# Patient Record
Sex: FEMALE | Race: WHITE | NOT HISPANIC OR LATINO | Employment: OTHER | URBAN - NONMETROPOLITAN AREA
[De-identification: names, ages, dates, MRNs, and addresses within clinical notes are randomized per-mention and may not be internally consistent; named-entity substitution may affect disease eponyms.]

---

## 2019-06-04 ENCOUNTER — HISTORICAL (OUTPATIENT)
Dept: ADMINISTRATIVE | Facility: HOSPITAL | Age: 64
End: 2019-06-04

## 2019-06-11 ENCOUNTER — HISTORICAL (OUTPATIENT)
Dept: ADMINISTRATIVE | Facility: HOSPITAL | Age: 64
End: 2019-06-11

## 2019-09-10 ENCOUNTER — HISTORICAL (OUTPATIENT)
Dept: ADMINISTRATIVE | Facility: HOSPITAL | Age: 64
End: 2019-09-10

## 2019-12-20 ENCOUNTER — HISTORICAL (OUTPATIENT)
Dept: ADMINISTRATIVE | Facility: HOSPITAL | Age: 64
End: 2019-12-20

## 2020-06-22 ENCOUNTER — HISTORICAL (OUTPATIENT)
Dept: ADMINISTRATIVE | Facility: HOSPITAL | Age: 65
End: 2020-06-22

## 2021-03-15 ENCOUNTER — HISTORICAL (OUTPATIENT)
Dept: ADMINISTRATIVE | Facility: HOSPITAL | Age: 66
End: 2021-03-15

## 2021-03-18 ENCOUNTER — HISTORICAL (OUTPATIENT)
Dept: ADMINISTRATIVE | Facility: HOSPITAL | Age: 66
End: 2021-03-18

## 2021-06-16 ENCOUNTER — HISTORICAL (OUTPATIENT)
Dept: ADMINISTRATIVE | Facility: HOSPITAL | Age: 66
End: 2021-06-16

## 2021-08-03 ENCOUNTER — HISTORICAL (OUTPATIENT)
Dept: ADMINISTRATIVE | Facility: HOSPITAL | Age: 66
End: 2021-08-03

## 2021-08-11 ENCOUNTER — HISTORICAL (OUTPATIENT)
Dept: ADMINISTRATIVE | Facility: HOSPITAL | Age: 66
End: 2021-08-11

## 2022-04-11 ENCOUNTER — HISTORICAL (OUTPATIENT)
Dept: ADMINISTRATIVE | Facility: HOSPITAL | Age: 67
End: 2022-04-11

## 2022-04-27 VITALS
OXYGEN SATURATION: 98 % | SYSTOLIC BLOOD PRESSURE: 142 MMHG | BODY MASS INDEX: 25.78 KG/M2 | DIASTOLIC BLOOD PRESSURE: 68 MMHG | WEIGHT: 164.25 LBS | HEIGHT: 67 IN

## 2023-01-30 ENCOUNTER — TELEPHONE (OUTPATIENT)
Dept: FAMILY MEDICINE | Facility: CLINIC | Age: 68
End: 2023-01-30
Payer: MEDICARE

## 2023-01-30 DIAGNOSIS — J30.2 SEASONAL ALLERGIES: Primary | ICD-10-CM

## 2023-01-30 RX ORDER — MONTELUKAST SODIUM 10 MG/1
10 TABLET ORAL DAILY
COMMUNITY
Start: 2022-11-23 | End: 2023-01-30 | Stop reason: SDUPTHER

## 2023-01-30 RX ORDER — MONTELUKAST SODIUM 10 MG/1
10 TABLET ORAL DAILY
Qty: 30 TABLET | Refills: 5 | Status: SHIPPED | OUTPATIENT
Start: 2023-01-30 | End: 2023-05-24 | Stop reason: ALTCHOICE

## 2023-01-30 NOTE — TELEPHONE ENCOUNTER
I called pt and she stated she was supposed to get refills on her montelukast last appt but it was never sent in. I refilled medication 90 day supply per pt request and I sent it to [facility] pharmacy. Pt is notified

## 2023-01-30 NOTE — TELEPHONE ENCOUNTER
----- Message from Amber Khan sent at 1/30/2023  8:24 AM CST -----  Regarding: return call    Pt requested to speak with the nurse    [phone]

## 2023-01-31 RX ORDER — LISINOPRIL 5 MG/1
5 TABLET ORAL DAILY
COMMUNITY
Start: 2022-04-25

## 2023-01-31 RX ORDER — ZOLPIDEM TARTRATE 10 MG/1
10 TABLET ORAL NIGHTLY
COMMUNITY
Start: 2022-08-17 | End: 2023-05-24 | Stop reason: SDUPTHER

## 2023-01-31 RX ORDER — ASPIRIN 325 MG
50000 TABLET, DELAYED RELEASE (ENTERIC COATED) ORAL WEEKLY
COMMUNITY
Start: 2022-08-17

## 2023-03-28 ENCOUNTER — OFFICE VISIT (OUTPATIENT)
Dept: FAMILY MEDICINE | Facility: CLINIC | Age: 68
End: 2023-03-28
Payer: MEDICARE

## 2023-03-28 VITALS
HEART RATE: 98 BPM | DIASTOLIC BLOOD PRESSURE: 74 MMHG | HEIGHT: 67 IN | WEIGHT: 160 LBS | TEMPERATURE: 98 F | BODY MASS INDEX: 25.11 KG/M2 | OXYGEN SATURATION: 96 % | SYSTOLIC BLOOD PRESSURE: 124 MMHG

## 2023-03-28 DIAGNOSIS — M25.521 RIGHT ELBOW PAIN: ICD-10-CM

## 2023-03-28 DIAGNOSIS — G89.29 CHRONIC PAIN OF BOTH KNEES: ICD-10-CM

## 2023-03-28 DIAGNOSIS — R05.3 CHRONIC COUGH: ICD-10-CM

## 2023-03-28 DIAGNOSIS — K21.9 GASTROESOPHAGEAL REFLUX DISEASE WITHOUT ESOPHAGITIS: ICD-10-CM

## 2023-03-28 DIAGNOSIS — I10 PRIMARY HYPERTENSION: ICD-10-CM

## 2023-03-28 DIAGNOSIS — M25.531 RIGHT WRIST PAIN: ICD-10-CM

## 2023-03-28 DIAGNOSIS — Z87.891 PERSONAL HISTORY OF NICOTINE DEPENDENCE: ICD-10-CM

## 2023-03-28 DIAGNOSIS — M54.42 ACUTE LEFT-SIDED LOW BACK PAIN WITH LEFT-SIDED SCIATICA: ICD-10-CM

## 2023-03-28 DIAGNOSIS — E78.00 HYPERCHOLESTEROLEMIA: ICD-10-CM

## 2023-03-28 DIAGNOSIS — M25.562 CHRONIC PAIN OF BOTH KNEES: ICD-10-CM

## 2023-03-28 DIAGNOSIS — Z11.59 ENCOUNTER FOR HEPATITIS C SCREENING TEST FOR LOW RISK PATIENT: ICD-10-CM

## 2023-03-28 DIAGNOSIS — Z12.2 ENCOUNTER FOR SCREENING FOR LUNG CANCER: Primary | ICD-10-CM

## 2023-03-28 DIAGNOSIS — M25.561 CHRONIC PAIN OF BOTH KNEES: ICD-10-CM

## 2023-03-28 DIAGNOSIS — M81.0 AGE-RELATED OSTEOPOROSIS WITHOUT CURRENT PATHOLOGICAL FRACTURE: ICD-10-CM

## 2023-03-28 PROBLEM — R01.1 HEART MURMUR: Status: ACTIVE | Noted: 2023-03-28

## 2023-03-28 PROBLEM — R73.01 IMPAIRED FASTING GLUCOSE: Status: ACTIVE | Noted: 2023-03-28

## 2023-03-28 PROBLEM — N60.19 FIBROCYSTIC BREAST CHANGES: Status: ACTIVE | Noted: 2023-03-28

## 2023-03-28 PROBLEM — G47.00 INSOMNIA: Status: ACTIVE | Noted: 2023-03-28

## 2023-03-28 PROBLEM — F17.210 CIGARETTE SMOKER: Status: ACTIVE | Noted: 2023-03-28

## 2023-03-28 PROBLEM — K13.21 LEUKOPLAKIA OF ORAL MUCOSA: Status: ACTIVE | Noted: 2023-03-28

## 2023-03-28 PROBLEM — Z96.22 PATENT TYMPANOSTOMY TUBE: Status: ACTIVE | Noted: 2023-03-28

## 2023-03-28 PROBLEM — I34.0 MITRAL VALVE INSUFFICIENCY: Status: ACTIVE | Noted: 2023-03-28

## 2023-03-28 PROBLEM — E55.9 VITAMIN D DEFICIENCY: Status: ACTIVE | Noted: 2023-03-28

## 2023-03-28 PROCEDURE — 99213 PR OFFICE/OUTPT VISIT, EST, LEVL III, 20-29 MIN: ICD-10-PCS | Mod: ,,, | Performed by: NURSE PRACTITIONER

## 2023-03-28 PROCEDURE — 99213 OFFICE O/P EST LOW 20 MIN: CPT | Mod: ,,, | Performed by: NURSE PRACTITIONER

## 2023-03-28 RX ORDER — FAMOTIDINE 20 MG/1
20 TABLET, FILM COATED ORAL NIGHTLY PRN
Qty: 60 TABLET | Refills: 11 | Status: SHIPPED | OUTPATIENT
Start: 2023-03-28 | End: 2024-03-27

## 2023-03-28 RX ORDER — METHYLPREDNISOLONE 4 MG/1
TABLET ORAL
Qty: 1 EACH | Refills: 0 | Status: SHIPPED | OUTPATIENT
Start: 2023-03-28 | End: 2023-05-24 | Stop reason: ALTCHOICE

## 2023-03-28 RX ORDER — MELOXICAM 7.5 MG/1
7.5 TABLET ORAL DAILY PRN
Qty: 30 TABLET | Refills: 3 | Status: SHIPPED | OUTPATIENT
Start: 2023-03-28 | End: 2023-05-24 | Stop reason: SDUPTHER

## 2023-03-28 NOTE — PROGRESS NOTES
Subjective:       Patient ID: Brandie Lobato is a 67 y.o. female.    Chief Complaint: Follow-up (Medication f/u)    HPI  67-year-old female presents to the clinic for follow-up. Patient c/o nontraumatic right elbow pain, low back back radiating to LLE, right wrist and bilateral knee pain for the past 3-4 months.   Colonoscopy in the past 10 year - last one WNL-patient refuses another colonoscopy.  Due for mammogram-patient refuses mammogram.  Tobacco: 1 pack/day- declines smoking cessation.   Drugs: Denies  EtOH: Denies    Labs from November 15, 2022  Glucose 99  BUN 15  Creatinine 0.6  GFR 94.8  Sodium 142  Potassium 4.0  ALT 15  AST 20  Alk phos 74  Cholesterol 189   HDL 39  .7  Triglycerides 126  TSH 1.70  Vitamin D 32.3  A1c 5.5  WBC 8.1  Hemoglobin 0.6  Hematocrit 44.1  Platelets 176    Labs from April 26, 2022  Glucose 94  BUN 14  Creatinine 0.60    Sodium 141  Potassium 4.4  ALT 14  AST 21  Alk phos 69  Cholesterol 195    HDL 38  Triglycerides 142  TSH 2.34  Vitamin D 23  WBC 8.1  Hemoglobin 14.5  Hematocrit 43.5  Platelets 185    Past Medical History:   Diagnosis Date    Fibrocystic breast     Heart murmur     Hypercholesteremia     Hypertension     Impaired fasting blood sugar     Insomnia     Mitral regurgitation     Vitamin D deficiency      Social History     Tobacco Use    Smoking status: Every Day     Packs/day: 1.00     Types: Cigarettes    Smokeless tobacco: Never   Substance Use Topics    Alcohol use: Not Currently    Drug use: Never     Past Surgical History:   Procedure Laterality Date    ADENOIDECTOMY      CHOLECYSTECTOMY      HYSTERECTOMY       History reviewed. No pertinent family history.    Review of Systems     See HPI  Objective:      Physical Exam   Constitutional: She is oriented to person, place, and time.   HENT:   Head: Normocephalic and atraumatic.   Nose: Nose normal.   Mouth/Throat: Mucous membranes are moist. Oropharynx is clear.   Eyes: Pupils are equal,  round, and reactive to light. Conjunctivae are normal.   Cardiovascular: Normal rate, regular rhythm and normal heart sounds. Exam reveals no gallop and no friction rub.   No murmur heard.  Pulmonary/Chest: Effort normal and breath sounds normal.   Abdominal: Normal appearance.   Musculoskeletal:      Left elbow: Normal.      Right wrist: No swelling, deformity, tenderness, bony tenderness or snuff box tenderness.      Left wrist: No swelling, deformity, tenderness, bony tenderness or snuff box tenderness.      Cervical back: Normal and normal range of motion.      Thoracic back: Normal.      Lumbar back: Tenderness (left) present. No deformity or bony tenderness. Positive left straight leg raise test. Negative right straight leg raise test.      Right lower leg: No edema.      Left lower leg: No edema.      Comments: Right elbow with mild tenderness w/o obvious deformities, erythema, ecchymosis, masses, swelling, abrasions, lacerations--NVI distally.    Neurological: She is alert and oriented to person, place, and time.   Skin: Skin is warm and dry. No rash noted. No jaundice or pallor.   Psychiatric: Her behavior is normal. Mood, judgment and thought content normal.   Nursing note and vitals reviewed.    Vitals:    03/28/23 1505   BP: 124/74   Pulse: 98   Temp: 97.5 °F (36.4 °C)     Assessment/Plan:       1. Encounter for screening for lung cancer    2. Gastroesophageal reflux disease without esophagitis    3. Acute left-sided low back pain with left-sided sciatica    4. Age-related osteoporosis without current pathological fracture    5. Right elbow pain    6. Right wrist pain    7. Chronic pain of both knees    8. Chronic cough    9. Personal history of nicotine dependence    10. Primary hypertension    11. Hypercholesterolemia    12. Encounter for hepatitis C screening test for low risk patient        1. Encounter for screening for lung cancer  - CT Chest Lung Screening Low Dose; Future    2. Gastroesophageal  reflux disease without esophagitis  - famotidine (PEPCID) 20 MG tablet; Take 1 tablet (20 mg total) by mouth nightly as needed for Heartburn.  Dispense: 60 tablet; Refill: 11    3. Acute left-sided low back pain with left-sided sciatica  - meloxicam (MOBIC) 7.5 MG tablet; Take 1 tablet (7.5 mg total) by mouth daily as needed for Pain (pain).  Dispense: 30 tablet; Refill: 3  - methylPREDNISolone (MEDROL DOSEPACK) 4 mg tablet; use as directed  Dispense: 1 each; Refill: 0    4. Age-related osteoporosis without current pathological fracture  - DXA Bone Density Axial Skeleton 1 or more sites; Future    5. Right elbow pain  - C4 Complement; Future  - Uric Acid; Future  - CYCLIC CITRULLINATED PEPTIDE (CCP) ANTIBODY; Future  - C-Reactive Protein; Future  - Sedimentation rate; Future  - GRZEGORZ IgG by IFA; Future    6. Right wrist pain  - C4 Complement; Future  - Uric Acid; Future  - CYCLIC CITRULLINATED PEPTIDE (CCP) ANTIBODY; Future  - C-Reactive Protein; Future  - Sedimentation rate; Future  - GRZEGORZ IgG by IFA; Future    7. Chronic pain of both knees  - C4 Complement; Future  - Uric Acid; Future  - CYCLIC CITRULLINATED PEPTIDE (CCP) ANTIBODY; Future  - C-Reactive Protein; Future  - Sedimentation rate; Future  - GRZEGORZ IgG by IFA; Future    8. Chronic cough  - CYCLIC CITRULLINATED PEPTIDE (CCP) ANTIBODY; Future    9. Personal history of nicotine dependence  - CT Chest Lung Screening Low Dose; Future    10. Primary hypertension    11. Hypercholesterolemia    12. Encounter for hepatitis C screening test for low risk patient    Follow up for appt pending in May 2023.Call sooner if needed.

## 2023-03-30 ENCOUNTER — TELEPHONE (OUTPATIENT)
Dept: FAMILY MEDICINE | Facility: CLINIC | Age: 68
End: 2023-03-30
Payer: MEDICARE

## 2023-03-30 RX ORDER — AMOXICILLIN 500 MG/1
500 TABLET, FILM COATED ORAL 3 TIMES DAILY
Qty: 30 TABLET | Refills: 0 | Status: SHIPPED | OUTPATIENT
Start: 2023-03-30 | End: 2023-04-09

## 2023-03-30 NOTE — TELEPHONE ENCOUNTER
Pt stated she would like to get a round of abx called out for her sore throat. She stated she did get the dose pack but she wants some abx as well.

## 2023-04-03 ENCOUNTER — HOSPITAL ENCOUNTER (OUTPATIENT)
Dept: RADIOLOGY | Facility: HOSPITAL | Age: 68
Discharge: HOME | End: 2023-04-03
Attending: NURSE PRACTITIONER
Payer: MEDICARE

## 2023-04-03 DIAGNOSIS — Z87.891 PERSONAL HISTORY OF NICOTINE DEPENDENCE: ICD-10-CM

## 2023-04-03 DIAGNOSIS — Z12.2 ENCOUNTER FOR SCREENING FOR LUNG CANCER: ICD-10-CM

## 2023-04-03 PROCEDURE — 71271 CT THORAX LUNG CANCER SCR C-: CPT | Mod: TC

## 2023-04-03 NOTE — PROGRESS NOTES
Please let patient know her CT of lungs showed COPD, degenerative changes, atherosclerotic plaquing in her arteries and incidental finding of a 6-7mm, nonobstructive stone in the right kidney. Would patient be interested in referral to urology?

## 2023-04-13 ENCOUNTER — HOSPITAL ENCOUNTER (EMERGENCY)
Facility: HOSPITAL | Age: 68
Discharge: HOME | End: 2023-04-13
Attending: FAMILY MEDICINE
Payer: MEDICARE

## 2023-04-13 ENCOUNTER — HOSPITAL ENCOUNTER (OUTPATIENT)
Dept: RADIOLOGY | Facility: HOSPITAL | Age: 68
Discharge: HOME | End: 2023-04-13
Attending: NURSE PRACTITIONER
Payer: MEDICARE

## 2023-04-13 VITALS
HEART RATE: 88 BPM | WEIGHT: 164 LBS | DIASTOLIC BLOOD PRESSURE: 78 MMHG | SYSTOLIC BLOOD PRESSURE: 119 MMHG | BODY MASS INDEX: 25.74 KG/M2 | OXYGEN SATURATION: 98 % | TEMPERATURE: 98 F | HEIGHT: 67 IN | RESPIRATION RATE: 18 BRPM

## 2023-04-13 DIAGNOSIS — K11.21 PAROTITIS, ACUTE: Primary | ICD-10-CM

## 2023-04-13 DIAGNOSIS — M81.0 AGE-RELATED OSTEOPOROSIS WITHOUT CURRENT PATHOLOGICAL FRACTURE: ICD-10-CM

## 2023-04-13 LAB
AMYLASE SERPL-CCNC: 468 UNIT/L (ref 30–100)
ANION GAP SERPL CALC-SCNC: 4 MEQ/L (ref 2–13)
BASOPHILS # BLD AUTO: 0.05 X10(3)/MCL (ref 0.01–0.08)
BASOPHILS NFR BLD AUTO: 0.5 % (ref 0.1–1.2)
BUN SERPL-MCNC: 14 MG/DL (ref 7–20)
CALCIUM SERPL-MCNC: 9.7 MG/DL (ref 8.4–10.2)
CHLORIDE SERPL-SCNC: 107 MMOL/L (ref 98–110)
CO2 SERPL-SCNC: 28 MMOL/L (ref 21–32)
CREAT SERPL-MCNC: 0.73 MG/DL (ref 0.66–1.25)
CREAT/UREA NIT SERPL: 19 (ref 12–20)
EOSINOPHIL # BLD AUTO: 0.32 X10(3)/MCL (ref 0.04–0.36)
EOSINOPHIL NFR BLD AUTO: 3.2 % (ref 0.7–7)
ERYTHROCYTE [DISTWIDTH] IN BLOOD BY AUTOMATED COUNT: 13.2 % (ref 11–14.5)
GFR SERPLBLD CREATININE-BSD FMLA CKD-EPI: 89 MLS/MIN/1.73/M2
GLUCOSE SERPL-MCNC: 138 MG/DL (ref 70–115)
HCT VFR BLD AUTO: 41.7 % (ref 36–48)
HGB BLD-MCNC: 14.3 G/DL (ref 11.8–16)
IMM GRANULOCYTES # BLD AUTO: 0.03 X10(3)/MCL (ref 0–0.03)
IMM GRANULOCYTES NFR BLD AUTO: 0.3 % (ref 0–0.5)
LIPASE SERPL-CCNC: 49 U/L (ref 23–300)
LYMPHOCYTES # BLD AUTO: 3.89 X10(3)/MCL (ref 1.16–3.74)
LYMPHOCYTES NFR BLD AUTO: 38.7 % (ref 20–55)
MCH RBC QN AUTO: 31.2 PG (ref 27–34)
MCV RBC AUTO: 90.8 FL (ref 79–99)
MEAN CELL HEMOGLOBIN CONCENTRATION (OHS) G/DL: 34.3 G/DL (ref 31–37)
MONOCYTES # BLD AUTO: 0.56 X10(3)/MCL (ref 0.24–0.36)
MONOCYTES NFR BLD AUTO: 5.6 % (ref 4.7–12.5)
NEUTROPHILS # BLD AUTO: 5.2 X10(3)/MCL (ref 1.56–6.13)
NEUTROPHILS NFR BLD AUTO: 51.7 % (ref 37–73)
NRBC BLD AUTO-RTO: 0 % (ref 0–1)
PLATELET # BLD AUTO: 168 X10(3)/MCL (ref 140–371)
PMV BLD AUTO: 11.6 FL (ref 9.4–12.4)
POTASSIUM SERPL-SCNC: 3.5 MMOL/L (ref 3.5–5.1)
RBC # BLD AUTO: 4.59 X10(6)/MCL (ref 4–5.1)
SODIUM SERPL-SCNC: 139 MMOL/L (ref 135–145)
WBC # SPEC AUTO: 10.1 X10(3)/MCL (ref 4–11.5)

## 2023-04-13 PROCEDURE — 25000003 PHARM REV CODE 250: Performed by: FAMILY MEDICINE

## 2023-04-13 PROCEDURE — 77080 DXA BONE DENSITY AXIAL: CPT | Mod: TC

## 2023-04-13 PROCEDURE — 80048 BASIC METABOLIC PNL TOTAL CA: CPT | Performed by: FAMILY MEDICINE

## 2023-04-13 PROCEDURE — 85025 COMPLETE CBC W/AUTO DIFF WBC: CPT | Performed by: FAMILY MEDICINE

## 2023-04-13 PROCEDURE — 99283 EMERGENCY DEPT VISIT LOW MDM: CPT | Mod: 25

## 2023-04-13 PROCEDURE — 82150 ASSAY OF AMYLASE: CPT | Performed by: FAMILY MEDICINE

## 2023-04-13 PROCEDURE — 83690 ASSAY OF LIPASE: CPT | Performed by: FAMILY MEDICINE

## 2023-04-13 PROCEDURE — 36415 COLL VENOUS BLD VENIPUNCTURE: CPT | Performed by: FAMILY MEDICINE

## 2023-04-13 RX ORDER — CEPHALEXIN 500 MG/1
500 CAPSULE ORAL
Status: COMPLETED | OUTPATIENT
Start: 2023-04-13 | End: 2023-04-13

## 2023-04-13 RX ORDER — CEPHALEXIN 500 MG/1
500 CAPSULE ORAL EVERY 6 HOURS
Qty: 28 CAPSULE | Refills: 0 | Status: SHIPPED | OUTPATIENT
Start: 2023-04-13 | End: 2023-04-20

## 2023-04-13 RX ADMIN — CEPHALEXIN 500 MG: 500 CAPSULE ORAL at 11:04

## 2023-04-14 NOTE — ED PROVIDER NOTES
Encounter Date: 4/13/2023       History     Chief Complaint   Patient presents with    Facial Swelling     SWELLING TO RIGHT SIDE OF FACE AND EAR, STARTED THIS EVENING     67-year-old white female presents to the emergency room complaining of right facial swelling starting tonight.  Patient states some mild pain.  Patient denies any toothache or earache.  Patient states he had a similar problem in the past but does not remember what the cause was.  Patient denies any fever sore throat.  Patient denies any other complaints.    The history is provided by the patient.   Review of patient's allergies indicates:  No Known Allergies  Past Medical History:   Diagnosis Date    Fibrocystic breast     Heart murmur     Hypercholesteremia     Hypertension     Impaired fasting blood sugar     Insomnia     Mitral regurgitation     Vitamin D deficiency      Past Surgical History:   Procedure Laterality Date    ADENOIDECTOMY      CHOLECYSTECTOMY      HYSTERECTOMY       History reviewed. No pertinent family history.  Social History     Tobacco Use    Smoking status: Every Day     Packs/day: 1.00     Types: Cigarettes    Smokeless tobacco: Never   Substance Use Topics    Alcohol use: Not Currently    Drug use: Never     Review of Systems   HENT:  Positive for facial swelling. Negative for ear pain and sore throat.    All other systems reviewed and are negative.    Physical Exam     Initial Vitals [04/13/23 2156]   BP Pulse Resp Temp SpO2   (!) 178/84 76 20 (!) 95.9 °F (35.5 °C) 95 %      MAP       --         Physical Exam    Nursing note and vitals reviewed.  Constitutional:   Well-developed well-nourished white female alert in no acute distress.   HENT:   Head is atraumatic normocephalic.  Left TM with tube in place.  Right external auditory canal with ceruminosis and unable to see TM.  Oropharynx is clear with dry mucous membranes.  No obvious dental abscess noted and no tooth tenderness palpation.  Right parotid area swelling with  minimal tenderness.  Nose is clear with no discharge.  No facial erythema or increased warmth.   Neck:   Neck is supple with anterior cervical lymphadenopathy.   Cardiovascular:            Heart is regular rate and rhythm without murmur.   Pulmonary/Chest:   Lungs are clear to auscultation bilaterally.   Abdominal:   Abdomen with positive bowel sounds.  Abdomen soft and nontender with no guarding or rebound.   Musculoskeletal:      Comments: Extremities with full range of motion throughout.     Neurological:   Patient is alert and oriented x3.  Upper and lower extremities with normal sensation and strength bilaterally.   Skin:   Skin is warm and dry.   Psychiatric: She has a normal mood and affect. Her behavior is normal. Thought content normal.       ED Course   Procedures  Labs Reviewed   BASIC METABOLIC PANEL - Abnormal; Notable for the following components:       Result Value    Glucose Level 138 (*)     All other components within normal limits   AMYLASE - Abnormal; Notable for the following components:    Amylase Level 468 (*)     All other components within normal limits   CBC WITH DIFFERENTIAL - Abnormal; Notable for the following components:    Lymph # 3.89 (*)     Mono # 0.56 (*)     All other components within normal limits   LIPASE - Normal   CBC W/ AUTO DIFFERENTIAL    Narrative:     The following orders were created for panel order CBC auto differential.  Procedure                               Abnormality         Status                     ---------                               -----------         ------                     CBC with Differential[[id number]]        Abnormal            Final result                 Please view results for these tests on the individual orders.          Imaging Results    None          Medications   cephALEXin capsule 500 mg (has no administration in time range)     Medical Decision Making:   Initial Assessment:   Parotitis  Clinical Tests:   Lab Tests: Ordered and  Reviewed  The following lab test(s) were unremarkable: Lipase, CBC and BMP  ED Management:  Discussed lab results with patient.  elevated amylase is consistent with parotitis.  We will treat patient with Keflex told her to increase her fluids use lemon drops to stimulate the parotid gland.  Instructed patient to follow up with her PCP if not improved.                        Clinical Impression:   Final diagnoses:  [K11.21] Parotitis, acute (Primary)        ED Disposition Condition    Discharge Stable          ED Prescriptions       Medication Sig Dispense Start Date End Date Auth. Provider    cephALEXin (KEFLEX) 500 MG capsule Take 1 capsule (500 mg total) by mouth every 6 (six) hours. for 7 days 28 capsule 4/13/2023 4/20/2023 Rubin Lemus MD          Follow-up Information       Follow up With Specialties Details Why Contact Info    Kalie Little NP Family Medicine In 1 week  [address] [facility] [address]  [phone]               Rubin Lemus MD  04/13/23 2211

## 2023-04-24 ENCOUNTER — OFFICE VISIT (OUTPATIENT)
Dept: FAMILY MEDICINE | Facility: CLINIC | Age: 68
End: 2023-04-24
Payer: MEDICARE

## 2023-04-24 VITALS
TEMPERATURE: 99 F | OXYGEN SATURATION: 95 % | SYSTOLIC BLOOD PRESSURE: 132 MMHG | HEIGHT: 67 IN | BODY MASS INDEX: 25.74 KG/M2 | WEIGHT: 164 LBS | DIASTOLIC BLOOD PRESSURE: 84 MMHG | HEART RATE: 85 BPM

## 2023-04-24 DIAGNOSIS — H66.005 RECURRENT ACUTE SUPPURATIVE OTITIS MEDIA WITHOUT SPONTANEOUS RUPTURE OF LEFT TYMPANIC MEMBRANE: Primary | ICD-10-CM

## 2023-04-24 PROCEDURE — 99212 OFFICE O/P EST SF 10 MIN: CPT | Mod: ,,, | Performed by: NURSE PRACTITIONER

## 2023-04-24 PROCEDURE — 99212 PR OFFICE/OUTPT VISIT, EST, LEVL II, 10-19 MIN: ICD-10-PCS | Mod: ,,, | Performed by: NURSE PRACTITIONER

## 2023-04-24 RX ORDER — AMOXICILLIN AND CLAVULANATE POTASSIUM 875; 125 MG/1; MG/1
1 TABLET, FILM COATED ORAL 2 TIMES DAILY
Qty: 14 TABLET | Refills: 0 | Status: SHIPPED | OUTPATIENT
Start: 2023-04-24 | End: 2023-05-24 | Stop reason: ALTCHOICE

## 2023-04-24 RX ORDER — OFLOXACIN 3 MG/ML
5 SOLUTION AURICULAR (OTIC) DAILY
Qty: 5 ML | Refills: 1 | Status: SHIPPED | OUTPATIENT
Start: 2023-04-24

## 2023-04-24 NOTE — PROGRESS NOTES
Subjective:       Patient ID: Brandie Lobato is a 67 y.o. female.    Chief Complaint: Ear Drainage    HPI  Patient presents to clinic with c/o left ear drainage and pain since last night. Reports PE tubes 1 year ago. Denies f/c. Seen in ER 4/13/2023 with right side facial swelling and pain, dx parotitis, rx cephalexin.   Past Medical History:   Diagnosis Date    Fibrocystic breast     Heart murmur     Hypercholesteremia     Hypertension     Impaired fasting blood sugar     Insomnia     Mitral regurgitation     Vitamin D deficiency      Social History     Tobacco Use    Smoking status: Every Day     Packs/day: 1.00     Types: Cigarettes    Smokeless tobacco: Never   Substance Use Topics    Alcohol use: Not Currently    Drug use: Never     Past Surgical History:   Procedure Laterality Date    ADENOIDECTOMY      CHOLECYSTECTOMY      HYSTERECTOMY       History reviewed. No pertinent family history.    Review of Systems   Constitutional:  Negative for chills and fever.   HENT:  Positive for ear discharge and ear pain. Negative for nasal congestion, rhinorrhea and sore throat.    Respiratory:  Positive for cough (chronic).    Cardiovascular:  Negative for chest pain.   Integumentary:  Negative for rash.   All other systems reviewed and are negative.     Objective:      Physical Exam   Constitutional: She is oriented to person, place, and time.   HENT:   Head: Normocephalic and atraumatic.   Right Ear: External ear and ear canal normal. No drainage. No mastoid tenderness. Tympanic membrane is not perforated and not erythematous.   Left Ear: External ear and ear canal normal. There is drainage. No mastoid tenderness. Tympanic membrane is erythematous. Tympanic membrane is not perforated.   Nose: Nose normal.   Mouth/Throat: Mucous membranes are moist. Oropharynx is clear.   Eyes: Pupils are equal, round, and reactive to light. Conjunctivae are normal.   Cardiovascular: Normal rate, regular rhythm and normal heart sounds.  Exam reveals no gallop and no friction rub.   No murmur heard.  Pulmonary/Chest: Effort normal and breath sounds normal.   Abdominal: Normal appearance.   Musculoskeletal:         General: Normal range of motion.      Cervical back: Normal range of motion and neck supple.   Neurological: She is alert and oriented to person, place, and time.   Skin: Skin is warm and dry. No rash noted. No jaundice or pallor.   Psychiatric: Her behavior is normal. Mood, judgment and thought content normal.   Nursing note and vitals reviewed.    Vitals:    04/24/23 1557   BP: 132/84   Pulse: 85   Temp: 99.2 °F (37.3 °C)     Admission on 04/13/2023, Discharged on 04/13/2023   Component Date Value Ref Range Status    Sodium Level 04/13/2023 139  135 - 145 mmol/L Final    Potassium Level 04/13/2023 3.5  3.5 - 5.1 mmol/L Final    Chloride 04/13/2023 107  98 - 110 mmol/L Final    Carbon Dioxide 04/13/2023 28  21 - 32 mmol/L Final    Glucose Level 04/13/2023 138 (H)  70 - 115 mg/dL Final    Blood Urea Nitrogen 04/13/2023 14.0  7.0 - 20.0 mg/dL Final    Creatinine 04/13/2023 0.73  0.66 - 1.25 mg/dL Final    BUN/Creatinine Ratio 04/13/2023 19  12 - 20 Final    Calcium Level Total 04/13/2023 9.7  8.4 - 10.2 mg/dL Final    Anion Gap 04/13/2023 4.0  2.0 - 13.0 mEq/L Final    eGFR 04/13/2023 89  mls/min/1.73/m2 Final                         EGFR INTERPRETATION    Beginning 8/15/22 we are reporting the eGFRcr calculation as recommended by the [employer]. The eGFRcr equation has similar overall performance characteristics to the older equation, but the values may differ by more than 10% particularly at higher values of eGFRcr and younger adult ages.    NKF stages of chronic kidney disease (CKD)  Stage 1: Kidney damage with normal or increased eGFR (>90 mL/min/1.73 m^2)  Stage 2: Mild reduction in GFR (60-89 mL/min/1.73 m^2)  Stage 3a: Moderate reduction in GFR (45-59 mL/min/1.73 m^2)  Stage 3b: Moderate reduction in GFR (30-44  mL/min/1.73 m^2)  Stage 4: Severe reduction in GFR (15-29 mL/min/1.73 m^2)  Stage 5: Kidney failure (GFR <15 mL/min/1.73 m^2)        Lipase Level 04/13/2023 49  23 - 300 U/L Final    Amylase Level 04/13/2023 468 (H)  30 - 100 unit/L Final    WBC 04/13/2023 10.1  4.0 - 11.5 x10(3)/mcL Final    RBC 04/13/2023 4.59  4.00 - 5.10 x10(6)/mcL Final    Hgb 04/13/2023 14.3  11.8 - 16.0 g/dL Final    Hct 04/13/2023 41.7  36.0 - 48.0 % Final    MCV 04/13/2023 90.8  79.0 - 99.0 fL Final    MCH 04/13/2023 31.2  27.0 - 34.0 pg Final    MCHC 04/13/2023 34.3  31.0 - 37.0 g/dL Final    RDW 04/13/2023 13.2  11.0 - 14.5 % Final    Platelet 04/13/2023 168  140 - 371 x10(3)/mcL Final    MPV 04/13/2023 11.6  9.4 - 12.4 fL Final    Neut % 04/13/2023 51.7  37 - 73 % Final    Lymph % 04/13/2023 38.7  20 - 55 % Final    Mono % 04/13/2023 5.6  4.7 - 12.5 % Final    Eos % 04/13/2023 3.2  0.7 - 7 % Final    Basophil % 04/13/2023 0.5  0.1 - 1.2 % Final    Lymph # 04/13/2023 3.89 (H)  1.16 - 3.74 x10(3)/mcL Final    Neut # 04/13/2023 5.20  1.56 - 6.13 x10(3)/mcL Final    Mono # 04/13/2023 0.56 (H)  0.24 - 0.36 x10(3)/mcL Final    Eos # 04/13/2023 0.32  0.04 - 0.36 x10(3)/mcL Final    Baso # 04/13/2023 0.05  0.01 - 0.08 x10(3)/mcL Final    IG# 04/13/2023 0.03  0.0001 - 0.031 x10(3)/mcL Final    IG% 04/13/2023 0.3  0 - 0.5 % Final    NRBC% 04/13/2023 0.0  0 - 1 % Final   Last labs from ER visit.  Assessment/Plan:       1. Recurrent acute suppurative otitis media without spontaneous rupture of left tympanic membrane          1. Recurrent acute suppurative otitis media without spontaneous rupture of left tympanic membrane  - amoxicillin-clavulanate 875-125mg (AUGMENTIN) 875-125 mg per tablet; Take 1 tablet by mouth 2 (two) times daily.  Dispense: 14 tablet; Refill: 0  - ofloxacin (FLOXIN) 0.3 % otic solution; Place 5 drops into the left ear once daily.  Dispense: 5 mL; Refill: 1      Follow up if symptoms worsen or fail to improve.Call sooner if  needed.

## 2023-04-29 PROBLEM — J44.1 CHRONIC OBSTRUCTIVE PULMONARY DISEASE WITH (ACUTE) EXACERBATION: Status: ACTIVE | Noted: 2023-04-29

## 2023-05-16 ENCOUNTER — LAB VISIT (OUTPATIENT)
Dept: FAMILY MEDICINE | Facility: CLINIC | Age: 68
End: 2023-05-16
Payer: MEDICARE

## 2023-05-16 DIAGNOSIS — M25.562 CHRONIC PAIN OF BOTH KNEES: ICD-10-CM

## 2023-05-16 DIAGNOSIS — G89.29 CHRONIC PAIN OF BOTH KNEES: ICD-10-CM

## 2023-05-16 DIAGNOSIS — M25.521 RIGHT ELBOW PAIN: ICD-10-CM

## 2023-05-16 DIAGNOSIS — Z11.59 ENCOUNTER FOR HCV SCREENING TEST FOR LOW RISK PATIENT: Primary | ICD-10-CM

## 2023-05-16 DIAGNOSIS — R05.3 CHRONIC COUGH: ICD-10-CM

## 2023-05-16 DIAGNOSIS — M25.561 CHRONIC PAIN OF BOTH KNEES: ICD-10-CM

## 2023-05-16 DIAGNOSIS — M25.531 RIGHT WRIST PAIN: ICD-10-CM

## 2023-05-16 DIAGNOSIS — E78.00 HYPERCHOLESTEROLEMIA: ICD-10-CM

## 2023-05-16 LAB
ALBUMIN SERPL-MCNC: 4.3 G/DL (ref 3.4–5)
ALBUMIN/GLOB SERPL: 1.7 RATIO
ALP SERPL-CCNC: 71 UNIT/L (ref 50–144)
ALT SERPL-CCNC: 17 UNIT/L (ref 1–45)
ANION GAP SERPL CALC-SCNC: 7 MEQ/L (ref 2–13)
AST SERPL-CCNC: 22 UNIT/L (ref 14–36)
BASOPHILS # BLD AUTO: 0.05 X10(3)/MCL (ref 0.01–0.08)
BASOPHILS NFR BLD AUTO: 0.7 % (ref 0.1–1.2)
BILIRUBIN DIRECT+TOT PNL SERPL-MCNC: 0.4 MG/DL (ref 0–1)
BUN SERPL-MCNC: 17 MG/DL (ref 7–20)
C-REACTIVE PROTEIN(NORTH LA, ST. MARY, RP & JENNINGS): 0.6 MG/DL (ref 0–0.9)
CALCIUM SERPL-MCNC: 9.2 MG/DL (ref 8.4–10.2)
CHLORIDE SERPL-SCNC: 108 MMOL/L (ref 98–110)
CHOLEST SERPL-MCNC: 115 MG/DL (ref 0–200)
CO2 SERPL-SCNC: 26 MMOL/L (ref 21–32)
CREAT SERPL-MCNC: 0.65 MG/DL (ref 0.66–1.25)
CREAT/UREA NIT SERPL: 26 (ref 12–20)
EOSINOPHIL # BLD AUTO: 0.31 X10(3)/MCL (ref 0.04–0.36)
EOSINOPHIL NFR BLD AUTO: 4.3 % (ref 0.7–7)
ERYTHROCYTE [DISTWIDTH] IN BLOOD BY AUTOMATED COUNT: 13.3 % (ref 11–14.5)
GFR SERPLBLD CREATININE-BSD FMLA CKD-EPI: >90 MLS/MIN/1.73/M2
GLOBULIN SER-MCNC: 2.5 GM/DL (ref 2–3.9)
GLUCOSE SERPL-MCNC: 101 MG/DL (ref 70–115)
HCT VFR BLD AUTO: 45 % (ref 36–48)
HDLC SERPL-MCNC: 44 MG/DL (ref 40–60)
HGB BLD-MCNC: 14.9 G/DL (ref 11.8–16)
IMM GRANULOCYTES # BLD AUTO: 0.02 X10(3)/MCL (ref 0–0.03)
IMM GRANULOCYTES NFR BLD AUTO: 0.3 % (ref 0–0.5)
LDLC SERPL DIRECT ASSAY-SCNC: 54.7 MG/DL (ref 30–100)
LYMPHOCYTES # BLD AUTO: 2.21 X10(3)/MCL (ref 1.16–3.74)
LYMPHOCYTES NFR BLD AUTO: 30.3 % (ref 20–55)
MCH RBC QN AUTO: 30.8 PG (ref 27–34)
MCHC RBC AUTO-ENTMCNC: 33.1 G/DL (ref 31–37)
MCV RBC AUTO: 93 FL (ref 79–99)
MONOCYTES # BLD AUTO: 0.46 X10(3)/MCL (ref 0.24–0.36)
MONOCYTES NFR BLD AUTO: 6.3 % (ref 4.7–12.5)
NEUTROPHILS # BLD AUTO: 4.24 X10(3)/MCL (ref 1.56–6.13)
NEUTROPHILS NFR BLD AUTO: 58.1 % (ref 37–73)
NRBC BLD AUTO-RTO: 0 %
PLATELET # BLD AUTO: 181 X10(3)/MCL (ref 140–371)
PMV BLD AUTO: 12.4 FL (ref 9.4–12.4)
POTASSIUM SERPL-SCNC: 4.6 MMOL/L (ref 3.5–5.1)
PROT SERPL-MCNC: 6.8 GM/DL (ref 6.3–8.2)
RBC # BLD AUTO: 4.84 X10(6)/MCL (ref 4–5.1)
SODIUM SERPL-SCNC: 141 MMOL/L (ref 135–145)
TRIGL SERPL-MCNC: 84 MG/DL (ref 30–200)
URATE SERPL-MCNC: 6 MG/DL (ref 2.6–7.2)
WBC # SPEC AUTO: 7.29 X10(3)/MCL (ref 4–11.5)

## 2023-05-16 PROCEDURE — 86140 C-REACTIVE PROTEIN: CPT

## 2023-05-16 PROCEDURE — 86039 ANTINUCLEAR ANTIBODIES (ANA): CPT | Mod: 90

## 2023-05-16 PROCEDURE — 85025 COMPLETE CBC W/AUTO DIFF WBC: CPT

## 2023-05-16 PROCEDURE — 86003 ALLG SPEC IGE CRUDE XTRC EA: CPT

## 2023-05-16 PROCEDURE — 86160 COMPLEMENT ANTIGEN: CPT

## 2023-05-16 PROCEDURE — 36415 COLL VENOUS BLD VENIPUNCTURE: CPT

## 2023-05-16 PROCEDURE — 84550 ASSAY OF BLOOD/URIC ACID: CPT

## 2023-05-16 PROCEDURE — 80053 COMPREHEN METABOLIC PANEL: CPT

## 2023-05-16 PROCEDURE — 86803 HEPATITIS C AB TEST: CPT

## 2023-05-16 PROCEDURE — 80061 LIPID PANEL: CPT

## 2023-05-16 PROCEDURE — 85651 RBC SED RATE NONAUTOMATED: CPT

## 2023-05-17 LAB
C4 SERPL-MCNC: 26.9 MG/DL (ref 13–46)
CYCLIC CITRULLINATED PEPTIDE (CCP) (OHS): 1.3 U/ML
ERYTHROCYTE [SEDIMENTATION RATE] IN BLOOD: 10 MM/HR (ref 0–20)
MAYO GENERIC ORDERABLE RESULT: NORMAL

## 2023-05-18 LAB
ANA PAT SER IF-IMP: ABNORMAL
ANA SER QL HEP2 SUBST: ABNORMAL
ANA TITR SER HEP2 SUBST: ABNORMAL {TITER}

## 2023-05-24 ENCOUNTER — OFFICE VISIT (OUTPATIENT)
Dept: FAMILY MEDICINE | Facility: CLINIC | Age: 68
End: 2023-05-24
Payer: MEDICARE

## 2023-05-24 VITALS
OXYGEN SATURATION: 96 % | DIASTOLIC BLOOD PRESSURE: 64 MMHG | HEIGHT: 67 IN | BODY MASS INDEX: 24.48 KG/M2 | SYSTOLIC BLOOD PRESSURE: 120 MMHG | WEIGHT: 156 LBS | TEMPERATURE: 98 F | HEART RATE: 108 BPM

## 2023-05-24 DIAGNOSIS — I10 BENIGN HYPERTENSION: ICD-10-CM

## 2023-05-24 DIAGNOSIS — M25.561 CHRONIC PAIN OF BOTH KNEES: ICD-10-CM

## 2023-05-24 DIAGNOSIS — E78.00 HYPERCHOLESTEROLEMIA: Primary | ICD-10-CM

## 2023-05-24 DIAGNOSIS — M25.521 RIGHT ELBOW PAIN: ICD-10-CM

## 2023-05-24 DIAGNOSIS — G89.29 CHRONIC PAIN OF BOTH KNEES: ICD-10-CM

## 2023-05-24 DIAGNOSIS — M54.42 ACUTE LEFT-SIDED LOW BACK PAIN WITH LEFT-SIDED SCIATICA: ICD-10-CM

## 2023-05-24 DIAGNOSIS — M25.531 RIGHT WRIST PAIN: ICD-10-CM

## 2023-05-24 DIAGNOSIS — N20.0 KIDNEY STONE: ICD-10-CM

## 2023-05-24 DIAGNOSIS — R10.819 SUPRAPUBIC TENDERNESS: ICD-10-CM

## 2023-05-24 DIAGNOSIS — M25.562 CHRONIC PAIN OF BOTH KNEES: ICD-10-CM

## 2023-05-24 DIAGNOSIS — F17.210 CIGARETTE SMOKER: ICD-10-CM

## 2023-05-24 DIAGNOSIS — J84.9 INTERSTITIAL PULMONARY DISEASE, UNSPECIFIED: ICD-10-CM

## 2023-05-24 DIAGNOSIS — F51.04 PSYCHOPHYSIOLOGICAL INSOMNIA: ICD-10-CM

## 2023-05-24 PROBLEM — J44.1 CHRONIC OBSTRUCTIVE PULMONARY DISEASE WITH (ACUTE) EXACERBATION: Status: RESOLVED | Noted: 2023-04-29 | Resolved: 2023-05-24

## 2023-05-24 PROCEDURE — 99214 OFFICE O/P EST MOD 30 MIN: CPT | Mod: ,,, | Performed by: NURSE PRACTITIONER

## 2023-05-24 PROCEDURE — 99214 PR OFFICE/OUTPT VISIT, EST, LEVL IV, 30-39 MIN: ICD-10-PCS | Mod: ,,, | Performed by: NURSE PRACTITIONER

## 2023-05-24 RX ORDER — MELOXICAM 7.5 MG/1
7.5 TABLET ORAL DAILY PRN
Qty: 30 TABLET | Refills: 3 | Status: SHIPPED | OUTPATIENT
Start: 2023-05-24 | End: 2023-08-29 | Stop reason: ALTCHOICE

## 2023-05-24 RX ORDER — ZOLPIDEM TARTRATE 10 MG/1
10 TABLET ORAL NIGHTLY
Qty: 30 TABLET | Refills: 3 | Status: SHIPPED | OUTPATIENT
Start: 2023-05-24 | End: 2023-08-23 | Stop reason: SDUPTHER

## 2023-05-24 NOTE — PROGRESS NOTES
Subjective:       Patient ID: Brandie Lobato is a 67 y.o. female.    Chief Complaint: Follow-up    HPI  Patient presents to the clinic with c/o suprapubic bone and left inner groin tenderness for the past week. Denies known trauma, injuries/fever, chills, difficulty urinating, difficulty with BM, near fall.   Patient previously reported c/o nontraumatic right elbow pain, low back back radiating to LLE, right wrist and bilateral knee pain for the past 3-4 months. RA and inflammatory markers were ordered.   Patient Active Problem List   Diagnosis    Benign hypertension    Cigarette smoker    Fibrocystic breast changes    Heart murmur    Hypercholesterolemia    Impaired fasting glucose    Insomnia    Leukoplakia of oral mucosa    Mitral valve insufficiency    Patent tympanostomy tube    Vitamin D deficiency    Interstitial pulmonary disease, unspecified     Social History     Tobacco Use    Smoking status: Every Day     Packs/day: 1.00     Types: Cigarettes    Smokeless tobacco: Never   Substance Use Topics    Alcohol use: Not Currently    Drug use: Never     Past Surgical History:   Procedure Laterality Date    ADENOIDECTOMY      CHOLECYSTECTOMY      HYSTERECTOMY       History reviewed. No pertinent family history.    Labs from November 15, 2022  Glucose 99  BUN 15  Creatinine 0.6  GFR 94.8  Sodium 142  Potassium 4.0  ALT 15  AST 20  Alk phos 74  Cholesterol 189 HDL 39  .7  Triglycerides 126  TSH 1.70  Vitamin D32.3  A1c 5.5  WBC 8.1  Hemoglobin 0.6  Hematocrit 44.1  Platelets 176    Labs from April 26, 2022  Glucose 94  BUN 14  Creatinine 0.60    Sodium 141  Potassium 4.4  ALT 14  AST 21  Alk phos 69  Cholesterol 195    HDL 38  Triglycerides 142  TSH 2.34  Vitamin D 23 0.8  WBC 8.1  Hemoglobin 14.5  Hematocrit 43.5  Platelets 185    Review of Systems   Constitutional:  Negative for chills and fever.   Respiratory:  Negative for cough and shortness of breath.    Cardiovascular:  Negative for  chest pain, palpitations and leg swelling.   Gastrointestinal:  Negative for abdominal pain, constipation, diarrhea, nausea, vomiting and reflux.   Genitourinary:  Negative for difficulty urinating and pelvic pain.   Musculoskeletal:  Positive for arthralgias and joint swelling.   Integumentary:  Negative for pallor, rash and wound.   Neurological:  Negative for weakness.   Hematological:  Negative for adenopathy.   All other systems reviewed and are negative.     Objective:      Physical Exam  Vitals and nursing note reviewed. Exam conducted with a chaperone present.   Constitutional:       General: She is awake.      Appearance: Normal appearance. She is well-developed, well-groomed and normal weight. She is not ill-appearing.   HENT:      Head: Normocephalic and atraumatic.      Right Ear: Tympanic membrane, ear canal and external ear normal. A PE tube (blue) is present.      Left Ear: Tympanic membrane, ear canal and external ear normal. No PE tube.      Nose: Nose normal.      Mouth/Throat:      Lips: Pink.      Mouth: Mucous membranes are moist.      Pharynx: Oropharynx is clear.   Eyes:      General: Lids are normal. Gaze aligned appropriately.      Extraocular Movements: Extraocular movements intact.      Conjunctiva/sclera: Conjunctivae normal.      Pupils: Pupils are equal, round, and reactive to light.   Neck:      Trachea: Trachea normal.   Cardiovascular:      Rate and Rhythm: Normal rate and regular rhythm.      Pulses:           Femoral pulses are 2+ on the right side and 2+ on the left side.     Heart sounds: Normal heart sounds.   Pulmonary:      Effort: Pulmonary effort is normal.      Breath sounds: Normal breath sounds.   Abdominal:      General: Abdomen is flat. Bowel sounds are normal. There is no distension.      Palpations: Abdomen is soft. There is no mass or pulsatile mass.      Hernia: No hernia is present. There is no hernia in the left inguinal area or right inguinal area.    Musculoskeletal:         General: Normal range of motion.      Cervical back: Normal, full passive range of motion without pain, normal range of motion and neck supple.      Thoracic back: Normal.      Lumbar back: No swelling, edema, deformity, signs of trauma, lacerations, spasms, tenderness or bony tenderness. Normal range of motion. Negative right straight leg raise test and negative left straight leg raise test. No scoliosis.      Right hip: Normal.      Left hip: Normal.      Left upper leg: Tenderness (left pubis and groin tendernes w/o erythema, ecchymosis, masses, swelling, lesions) present.   Lymphadenopathy:      Lower Body: No right inguinal adenopathy. No left inguinal adenopathy.   Skin:     General: Skin is warm and dry.   Neurological:      General: No focal deficit present.      Mental Status: She is alert and oriented to person, place, and time. Mental status is at baseline.   Psychiatric:         Mood and Affect: Mood normal.         Behavior: Behavior normal. Behavior is cooperative.         Thought Content: Thought content normal.         Judgment: Judgment normal.       Vitals:    05/24/23 1504   BP: 120/64   Pulse: 108   Temp: 97.8 °F (36.6 °C)     Lab Visit on 05/16/2023   Component Date Value Ref Range Status    C4 Complement 05/16/2023 26.9  13.0 - 46.0 mg/dL Final    Uric Acid 05/16/2023 6.0  2.6 - 7.2 mg/dL Final    CCP Quant 05/16/2023 1.3  <7 U/mL Final      <7:   Negative  7-10: Equivocal  >10:  Positive    CRP 05/16/2023 0.6  0 - 0.9 mg/dL Final    Sed Rate 05/16/2023 10  0 - 20 mm/hr Final    Antinuclear Ab, HEp-2 Substrate 05/16/2023 Positive 1:160 (A)  <1:80 (Negative) Final       -------------------ADDITIONAL INFORMATION-------------------  Method: Immunofluorescence using HEp-2 cellular substrate.    GRZEGORZ Titer: 05/16/2023 1:160   Final    GRZEGORZ Pattern: 05/16/2023 Homogeneous   Final       Test Performed by:  [facility] [address]  [address] [place] [address]  : Lenard Reyes M.D. Ph.D.; IA# [id number]    Cholesterol Total 05/16/2023 115  0 - 200 mg/dL Final    HDL Cholesterol 05/16/2023 44  40 - 60 mg/dL Final    Triglyceride 05/16/2023 84  30 - 200 mg/dL Final    LDL Cholesterol Direct 05/16/2023 54.7  30.0 - 100.0 mg/dL Final    Sodium Level 05/16/2023 141  135 - 145 mmol/L Final    Potassium Level 05/16/2023 4.6  3.5 - 5.1 mmol/L Final    Chloride 05/16/2023 108  98 - 110 mmol/L Final    Carbon Dioxide 05/16/2023 26  21 - 32 mmol/L Final    Glucose Level 05/16/2023 101  70 - 115 mg/dL Final    Blood Urea Nitrogen 05/16/2023 17.0  7.0 - 20.0 mg/dL Final    Creatinine 05/16/2023 0.65 (L)  0.66 - 1.25 mg/dL Final    Calcium Level Total 05/16/2023 9.2  8.4 - 10.2 mg/dL Final    Protein Total 05/16/2023 6.8  6.3 - 8.2 gm/dL Final    Albumin Level 05/16/2023 4.3  3.4 - 5.0 g/dL Final    Globulin 05/16/2023 2.5  2.0 - 3.9 gm/dL Final    Albumin/Globulin Ratio 05/16/2023 1.7  ratio Final    Bilirubin Total 05/16/2023 0.4  0.0 - 1.0 mg/dL Final    Alkaline Phosphatase 05/16/2023 71  50 - 144 unit/L Final    Alanine Aminotransferase 05/16/2023 17  1 - 45 unit/L Final    Aspartate Aminotransferase 05/16/2023 22  14 - 36 unit/L Final    eGFR 05/16/2023 >90  mls/min/1.73/m2 Final                         EGFR INTERPRETATION    Beginning 8/15/22 we are reporting the eGFRcr calculation as recommended by the [employer]. The eGFRcr equation has similar overall performance characteristics to the older equation, but the values may differ by more than 10% particularly at higher values of eGFRcr and younger adult ages.    NKF stages of chronic kidney disease (CKD)  Stage 1: Kidney damage with normal or increased eGFR (>90 mL/min/1.73 m^2)  Stage 2: Mild reduction in GFR (60-89 mL/min/1.73 m^2)  Stage 3a: Moderate reduction in GFR (45-59 mL/min/1.73 m^2)  Stage 3b: Moderate reduction in GFR (30-44 mL/min/1.73 m^2)  Stage 4:  Severe reduction in GFR (15-29 mL/min/1.73 m^2)  Stage 5: Kidney failure (GFR <15 mL/min/1.73 m^2)        Anion Gap 05/16/2023 7.0  2.0 - 13.0 mEq/L Final    BUN/Creatinine Ratio 05/16/2023 26 (H)  12 - 20 Final    Ulysses Generic Orderable 05/16/2023 SEE COMMENTS   Final       Test                             Result           Flag  Unit  RefValue  ----------------------------------------------------------------------  HCV Ab Scrn w/Reflex to HCV PCR, S    HCV Ab Screen, S               Negative                     Negative  Signal-to-cutoff ratio is <1.00.     Test Performed by:  [facility] [place] [address]  : Lenard Reyes M.D. Ph.D.; CLIA# [id number]    WBC 05/16/2023 7.29  4.00 - 11.50 x10(3)/mcL Final    RBC 05/16/2023 4.84  4.00 - 5.10 x10(6)/mcL Final    Hgb 05/16/2023 14.9  11.8 - 16.0 g/dL Final    Hct 05/16/2023 45.0  36.0 - 48.0 % Final    MCV 05/16/2023 93.0  79.0 - 99.0 fL Final    MCH 05/16/2023 30.8  27.0 - 34.0 pg Final    MCHC 05/16/2023 33.1  31.0 - 37.0 g/dL Final    RDW 05/16/2023 13.3  11.0 - 14.5 % Final    Platelet 05/16/2023 181  140 - 371 x10(3)/mcL Final    MPV 05/16/2023 12.4  9.4 - 12.4 fL Final    Neut % 05/16/2023 58.1  37 - 73 % Final    Lymph % 05/16/2023 30.3  20 - 55 % Final    Mono % 05/16/2023 6.3  4.7 - 12.5 % Final    Eos % 05/16/2023 4.3  0.7 - 7 % Final    Basophil % 05/16/2023 0.7  0.1 - 1.2 % Final    Lymph # 05/16/2023 2.21  1.16 - 3.74 x10(3)/mcL Final    Neut # 05/16/2023 4.24  1.56 - 6.13 x10(3)/mcL Final    Mono # 05/16/2023 0.46 (H)  0.24 - 0.36 x10(3)/mcL Final    Eos # 05/16/2023 0.31  0.04 - 0.36 x10(3)/mcL Final    Baso # 05/16/2023 0.05  0.01 - 0.08 x10(3)/mcL Final    IG# 05/16/2023 0.02  0.0001 - 0.031 x10(3)/mcL Final    IG% 05/16/2023 0.3  0 - 0.5 % Final    NRBC% 05/16/2023 0.0  <=1 % Final     Assessment/Plan:     1. Hypercholesterolemia  Controlled. FLP noted from  5/16/2023.  Continue - rosuvastatin 10 mg; take 10 mg by mouth Daily.  Dispense: 90 capsule; Refill: 3  For cardiac risk factors- Ambulatory referral/consult to Cardiology; Future    2. Acute left-sided low back pain with left-sided sciatica  Refill- meloxicam (MOBIC) 7.5 MG tablet; Take 1 tablet (7.5 mg total) by mouth daily as needed for Pain (pain).  Dispense: 30 tablet; Refill: 3    3. Psychophysiological insomnia  Refill- zolpidem (AMBIEN) 10 mg Tab; Take 1 tablet (10 mg total) by mouth nightly.  Dispense: 30 tablet; Refill: 3    4. Benign hypertension  Controlled 124/80.  Continue medication lisinopril 5mg po daily as rx.   Cardiac risk factors- Ambulatory referral/consult to Cardiology; Future    5. Suprapubic tenderness  - CT Abdomen Pelvis  Without Contrast; Future    6. Kidney stone- incidental finding on CT low dose lung screening. Right kidney stone 6mm-7mm.  - CT Abdomen Pelvis  Without Contrast; Future    7. Interstitial pulmonary disease, unspecified  Refuses pulmonology referral.     8. Right elbow pain, joint pain  - Ambulatory referral/consult to Rheumatology; Future    9. Right wrist pain, joint pain  - Ambulatory referral/consult to Rheumatology; Future    10. Chronic pain of both knees  - Ambulatory referral/consult to Rheumatology; Future    11. Cigarette smoker  Refuses to quit smoking.   Cardiac risk factors- Ambulatory referral/consult to Cardiology; Future    Follow up in about 3 months (around 8/24/2023) for ambien f/u, referral f/u.Call sooner if needed.     Follow up next week for CT abdomen/pelvis review.

## 2023-05-25 ENCOUNTER — HOSPITAL ENCOUNTER (OUTPATIENT)
Dept: RADIOLOGY | Facility: HOSPITAL | Age: 68
Discharge: HOME | End: 2023-05-25
Attending: NURSE PRACTITIONER
Payer: MEDICARE

## 2023-05-25 DIAGNOSIS — R10.819 SUPRAPUBIC TENDERNESS: ICD-10-CM

## 2023-05-25 DIAGNOSIS — N20.0 KIDNEY STONE: ICD-10-CM

## 2023-05-25 PROCEDURE — 74176 CT ABD & PELVIS W/O CONTRAST: CPT | Mod: TC

## 2023-05-29 ENCOUNTER — TELEPHONE (OUTPATIENT)
Dept: FAMILY MEDICINE | Facility: CLINIC | Age: 68
End: 2023-05-29
Payer: MEDICARE

## 2023-05-29 DIAGNOSIS — R10.2 PELVIC PAIN IN FEMALE: Primary | ICD-10-CM

## 2023-05-29 NOTE — TELEPHONE ENCOUNTER
----- Message from Amber Khan sent at 5/29/2023 11:24 AM CDT -----  Regarding: return call      Pt requests return call to elaborate on test results from Thursday. Was told nothing was urgent. Wants a more detailed assessment    [phone]

## 2023-05-30 DIAGNOSIS — N20.0 KIDNEY STONE: Primary | ICD-10-CM

## 2023-05-30 DIAGNOSIS — N20.0 RIGHT KIDNEY STONE: ICD-10-CM

## 2023-05-30 RX ORDER — ROSUVASTATIN CALCIUM 10 MG/1
10 TABLET, COATED ORAL DAILY
COMMUNITY
Start: 2023-05-24

## 2023-05-30 NOTE — TELEPHONE ENCOUNTER
CT results right kidney stone - referral sent to urology.  Adrenal gland nodule - benign, unknown cause, concern when >4cm. No acute findings - pelvis - per radiologist. + arthritis/degenerative changes on CT and previous xrays and DEXA scan.

## 2023-05-31 ENCOUNTER — OFFICE VISIT (OUTPATIENT)
Dept: FAMILY MEDICINE | Facility: CLINIC | Age: 68
End: 2023-05-31
Payer: MEDICARE

## 2023-05-31 VITALS
DIASTOLIC BLOOD PRESSURE: 80 MMHG | SYSTOLIC BLOOD PRESSURE: 150 MMHG | TEMPERATURE: 99 F | HEIGHT: 67 IN | HEART RATE: 100 BPM | WEIGHT: 156 LBS | OXYGEN SATURATION: 93 % | BODY MASS INDEX: 24.48 KG/M2

## 2023-05-31 DIAGNOSIS — M54.16 LUMBAR BACK PAIN WITH RADICULOPATHY AFFECTING LEFT LOWER EXTREMITY: Primary | ICD-10-CM

## 2023-05-31 DIAGNOSIS — R93.7 ABNORMAL MRI, LUMBAR SPINE: ICD-10-CM

## 2023-05-31 PROCEDURE — 96372 PR INJECTION,THERAP/PROPH/DIAG2ST, IM OR SUBCUT: ICD-10-PCS | Mod: ,,, | Performed by: NURSE PRACTITIONER

## 2023-05-31 PROCEDURE — 96372 THER/PROPH/DIAG INJ SC/IM: CPT | Mod: ,,, | Performed by: NURSE PRACTITIONER

## 2023-05-31 PROCEDURE — 99214 OFFICE O/P EST MOD 30 MIN: CPT | Mod: 25,,, | Performed by: NURSE PRACTITIONER

## 2023-05-31 PROCEDURE — 99214 PR OFFICE/OUTPT VISIT, EST, LEVL IV, 30-39 MIN: ICD-10-PCS | Mod: 25,,, | Performed by: NURSE PRACTITIONER

## 2023-05-31 RX ORDER — GABAPENTIN 100 MG/1
100 CAPSULE ORAL 3 TIMES DAILY
Qty: 30 CAPSULE | Refills: 0 | Status: SHIPPED | OUTPATIENT
Start: 2023-05-31 | End: 2023-06-22 | Stop reason: SDUPTHER

## 2023-05-31 RX ORDER — DEXAMETHASONE SODIUM PHOSPHATE 4 MG/ML
8 INJECTION, SOLUTION INTRA-ARTICULAR; INTRALESIONAL; INTRAMUSCULAR; INTRAVENOUS; SOFT TISSUE
Status: COMPLETED | OUTPATIENT
Start: 2023-05-31 | End: 2023-05-31

## 2023-05-31 RX ORDER — HYDROCODONE BITARTRATE AND ACETAMINOPHEN 5; 325 MG/1; MG/1
1 TABLET ORAL EVERY 8 HOURS PRN
Qty: 20 TABLET | Refills: 0 | Status: SHIPPED | OUTPATIENT
Start: 2023-05-31 | End: 2023-06-08 | Stop reason: SDUPTHER

## 2023-05-31 RX ADMIN — DEXAMETHASONE SODIUM PHOSPHATE 8 MG: 4 INJECTION, SOLUTION INTRA-ARTICULAR; INTRALESIONAL; INTRAMUSCULAR; INTRAVENOUS; SOFT TISSUE at 04:05

## 2023-05-31 NOTE — PROGRESS NOTES
Subjective:       Patient ID: Brandie Lobato is a 67 y.o. female.    Chief Complaint: Pain    HPI  Patient presents to the clinic with c/o suprapubic bone and left inner groin tenderness for the past week that is now radiating to LLE. Denies known trauma/injuries, fever, chills, saddles anesthesia, difficulty urinating, difficulty with BM, near fall.     Past Medical History:   Diagnosis Date    Fibrocystic breast     Heart murmur     Hypercholesteremia     Hypertension     Impaired fasting blood sugar     Insomnia     Mitral regurgitation     Vitamin D deficiency      Social History     Tobacco Use    Smoking status: Every Day     Packs/day: 1.00     Types: Cigarettes    Smokeless tobacco: Never   Substance Use Topics    Alcohol use: Not Currently    Drug use: Never     Past Surgical History:   Procedure Laterality Date    ADENOIDECTOMY      CHOLECYSTECTOMY      HYSTERECTOMY       History reviewed. No pertinent family history.    Review of Systems   - see HPI  Objective:      Physical Exam  Vitals and nursing note reviewed.   Constitutional:       Appearance: Normal appearance. She is normal weight.   HENT:      Head: Normocephalic and atraumatic.      Nose: Nose normal.      Mouth/Throat:      Mouth: Mucous membranes are moist.      Pharynx: Oropharynx is clear.   Eyes:      Extraocular Movements: Extraocular movements intact.      Conjunctiva/sclera: Conjunctivae normal.      Pupils: Pupils are equal, round, and reactive to light.   Cardiovascular:      Rate and Rhythm: Normal rate and regular rhythm.      Heart sounds: Normal heart sounds. No murmur heard.    No friction rub. No gallop.   Pulmonary:      Effort: Pulmonary effort is normal.      Breath sounds: Normal breath sounds.   Musculoskeletal:         General: No swelling, tenderness, deformity or signs of injury. Normal range of motion.      Cervical back: Normal range of motion and neck supple.      Right lower leg: No edema.      Left lower leg: No  edema.   Skin:     General: Skin is warm and dry.      Coloration: Skin is not jaundiced or pale.      Findings: No rash.   Neurological:      General: No focal deficit present.      Mental Status: She is alert and oriented to person, place, and time. Mental status is at baseline.      Sensory: Sensation is intact.      Motor: Motor function is intact.      Coordination: Coordination is intact.      Gait: Gait is intact.      Deep Tendon Reflexes:      Reflex Scores:       Patellar reflexes are 2+ on the right side and 2+ on the left side.       Achilles reflexes are 2+ on the right side and 2+ on the left side.  Psychiatric:         Mood and Affect: Mood normal.         Behavior: Behavior normal.         Thought Content: Thought content normal.         Judgment: Judgment normal.       Vitals:    05/31/23 1542   BP: (!) 150/80   Pulse: 100   Temp: 98.5 °F (36.9 °C)     Lab Visit on 05/16/2023   Component Date Value Ref Range Status    C4 Complement 05/16/2023 26.9  13.0 - 46.0 mg/dL Final    Uric Acid 05/16/2023 6.0  2.6 - 7.2 mg/dL Final    CCP Quant 05/16/2023 1.3  <7 U/mL Final      <7:   Negative  7-10: Equivocal  >10:  Positive    CRP 05/16/2023 0.6  0 - 0.9 mg/dL Final    Sed Rate 05/16/2023 10  0 - 20 mm/hr Final    Antinuclear Ab, HEp-2 Substrate 05/16/2023 Positive 1:160 (A)  <1:80 (Negative) Final       -------------------ADDITIONAL INFORMATION-------------------  Method: Immunofluorescence using HEp-2 cellular substrate.    GRZEGORZ Titer: 05/16/2023 1:160   Final    GRZEGORZ Pattern: 05/16/2023 Homogeneous   Final       Test Performed by:  [facility]  [address] [place] [address]  : Lenard Reyes M.D. Ph.D.; CLIA# [id number]    Cholesterol Total 05/16/2023 115  0 - 200 mg/dL Final    HDL Cholesterol 05/16/2023 44  40 - 60 mg/dL Final    Triglyceride 05/16/2023 84  30 - 200 mg/dL Final    LDL Cholesterol Direct 05/16/2023 54.7  30.0 - 100.0  mg/dL Final    Sodium Level 05/16/2023 141  135 - 145 mmol/L Final    Potassium Level 05/16/2023 4.6  3.5 - 5.1 mmol/L Final    Chloride 05/16/2023 108  98 - 110 mmol/L Final    Carbon Dioxide 05/16/2023 26  21 - 32 mmol/L Final    Glucose Level 05/16/2023 101  70 - 115 mg/dL Final    Blood Urea Nitrogen 05/16/2023 17.0  7.0 - 20.0 mg/dL Final    Creatinine 05/16/2023 0.65 (L)  0.66 - 1.25 mg/dL Final    Calcium Level Total 05/16/2023 9.2  8.4 - 10.2 mg/dL Final    Protein Total 05/16/2023 6.8  6.3 - 8.2 gm/dL Final    Albumin Level 05/16/2023 4.3  3.4 - 5.0 g/dL Final    Globulin 05/16/2023 2.5  2.0 - 3.9 gm/dL Final    Albumin/Globulin Ratio 05/16/2023 1.7  ratio Final    Bilirubin Total 05/16/2023 0.4  0.0 - 1.0 mg/dL Final    Alkaline Phosphatase 05/16/2023 71  50 - 144 unit/L Final    Alanine Aminotransferase 05/16/2023 17  1 - 45 unit/L Final    Aspartate Aminotransferase 05/16/2023 22  14 - 36 unit/L Final    eGFR 05/16/2023 >90  mls/min/1.73/m2 Final                         EGFR INTERPRETATION    Beginning 8/15/22 we are reporting the eGFRcr calculation as recommended by the [employer]. The eGFRcr equation has similar overall performance characteristics to the older equation, but the values may differ by more than 10% particularly at higher values of eGFRcr and younger adult ages.    NKF stages of chronic kidney disease (CKD)  Stage 1: Kidney damage with normal or increased eGFR (>90 mL/min/1.73 m^2)  Stage 2: Mild reduction in GFR (60-89 mL/min/1.73 m^2)  Stage 3a: Moderate reduction in GFR (45-59 mL/min/1.73 m^2)  Stage 3b: Moderate reduction in GFR (30-44 mL/min/1.73 m^2)  Stage 4: Severe reduction in GFR (15-29 mL/min/1.73 m^2)  Stage 5: Kidney failure (GFR <15 mL/min/1.73 m^2)        Anion Gap 05/16/2023 7.0  2.0 - 13.0 mEq/L Final    BUN/Creatinine Ratio 05/16/2023 26 (H)  12 - 20 Final    [place] Generic Orderable 05/16/2023 SEE COMMENTS   Final       Test                              Result           Flag  Unit  RefValue  ----------------------------------------------------------------------  HCV Ab Scrn w/Reflex to HCV PCR, S    HCV Ab Screen, S               Negative                     Negative  Signal-to-cutoff ratio is <1.00.     Test Performed by:  [facility]  [address] [place] [address]  : Lenard Reyes M.D. Ph.D.; CLIA# [id number]    WBC 05/16/2023 7.29  4.00 - 11.50 x10(3)/mcL Final    RBC 05/16/2023 4.84  4.00 - 5.10 x10(6)/mcL Final    Hgb 05/16/2023 14.9  11.8 - 16.0 g/dL Final    Hct 05/16/2023 45.0  36.0 - 48.0 % Final    MCV 05/16/2023 93.0  79.0 - 99.0 fL Final    MCH 05/16/2023 30.8  27.0 - 34.0 pg Final    MCHC 05/16/2023 33.1  31.0 - 37.0 g/dL Final    RDW 05/16/2023 13.3  11.0 - 14.5 % Final    Platelet 05/16/2023 181  140 - 371 x10(3)/mcL Final    MPV 05/16/2023 12.4  9.4 - 12.4 fL Final    Neut % 05/16/2023 58.1  37 - 73 % Final    Lymph % 05/16/2023 30.3  20 - 55 % Final    Mono % 05/16/2023 6.3  4.7 - 12.5 % Final    Eos % 05/16/2023 4.3  0.7 - 7 % Final    Basophil % 05/16/2023 0.7  0.1 - 1.2 % Final    Lymph # 05/16/2023 2.21  1.16 - 3.74 x10(3)/mcL Final    Neut # 05/16/2023 4.24  1.56 - 6.13 x10(3)/mcL Final    Mono # 05/16/2023 0.46 (H)  0.24 - 0.36 x10(3)/mcL Final    Eos # 05/16/2023 0.31  0.04 - 0.36 x10(3)/mcL Final    Baso # 05/16/2023 0.05  0.01 - 0.08 x10(3)/mcL Final    IG# 05/16/2023 0.02  0.0001 - 0.031 x10(3)/mcL Final    IG% 05/16/2023 0.3  0 - 0.5 % Final    NRBC% 05/16/2023 0.0  <=1 % Final     MRI Lumbar Spine Without Contrast  Order: [id number]  Status: Final result     Visible to patient: Yes (seen)     Next appt: 08/24/2023 at 03:30 PM in Family Medicine (Kalie Little NP)     Dx: Dorsalgia, unspecified     0 Result Notes  Details    Reading Physician Reading Date Result Priority   Abi Neely III, MD  [phone] 6/8/2023 Routine     Narrative &  Impression  EXAMINATION:  STUDY: MRI LUMBAR SPINE WITHOUT CONTRAST     CLINICAL HISTORY AND TECHNIQUE:  Donteamber Godwin, RT on 6/8/2023  8:37 AM     MRI L-SPINE W/O     LT GROIN PAIN RADIATING DOWN LEG X 6 WEEKS     ORDERING DR AVALOS     SEQUENCES  SAG T1 , T2 , T2 FSAT     COR T2     AX T2     TECH  SD     COMPARISON:  None     FINDINGS:  Multiplanar imaging through the lumbar spine using multiple sequences was performed.  A moderate, levoconvex, scoliotic curvature of the lumbar spine is present and makes evaluation much more difficult.  No abnormal signal intensity is noted within the thecal sac, descending or exiting nerve roots.  I see no evidence of acute fractures or clinically significant spondylolisthesis. There is no focal soft tissue swelling or paravertebral hematomas. There is mild to moderate thickening and tortuosity of centrally located descending nerve roots at the L4-L5 level.     L1-L2:  Moderate facet joint to lesser extent vertebral body spurring is noted at this level with mild hypertrophy of ligamentum flavum.  There is no significant disc bulging or herniation in the vertebral canal, lateral recesses and neural foramina appear adequately patent.     L2-L3:  There is moderate disc space narrowing with mild to moderate generalized disc bulging.  Moderate vertebral body and facet joint spurring are noted with mild hypertrophy of ligamentum flavum.  There is mild narrowing of the vertebral canal and lateral recesses and to a lesser extent the neural foramina with no significant impingement upon descending or exiting nerve roots noted.     L3-L4:  Moderate facet joint to lesser extent vertebral body spurring are noted at this level with moderate hypertrophy of ligamentum flavum and mild to moderate generalized disc bulging.  The combination of changes results in moderate narrowing of the vertebral canal and lateral recesses with moderate impingement upon the descending L4 nerve roots within both  lateral recesses (more pronounced on the right).  There is also moderate narrowing of the right neural foramina and I suspect at least mild impingement upon the exiting nerve root with changes in position such as flexion, extension, and twisting.     L4-L5:  Extensive facet joint and mild to moderate facet joint spurring are noted at this level with moderate hypertrophy of ligamentum flavum and moderate posterior disc bulging.  There is 3-4 mm of anterior spondylolisthesis of L4 on L5 which I suspect is secondary to ligamentous laxity related to the patient's extensive degenerative changes.  The combination of changes results in significant narrowing of the vertebral canal and both lateral recesses with significant impingement upon centrally located descending nerve roots as well as the descending L5 nerve roots within both lateral recesses.  There is at least moderate narrowing of both neural foramina with mild to moderate impingement upon the exiting nerve roots within both neural foramina.     L5-S1:  There is moderate disc space narrowing with moderate vertebral body and facet joint spurring and moderate generalized disc bulging.  The combination of changes results in moderate narrowing of the left lateral recess with mild to moderate impingement upon the descending left S1 nerve root.  There is moderate narrowing of the left neural foramina with moderate impingement upon the exiting nerve root.  There is minimal narrowing of the right neural foramina with minimal impingement upon the exiting nerve root.     Impression:     1. A moderate, levoconvex, scoliotic curvature of the lumbar spine is present making evaluation more difficult.  2. Fairly extensive degenerative changes are noted throughout the lumbar spine and are most pronounced at the L4-L5 level.  I suspect the patient is symptomatic at the L2 through S1 levels and most symptomatic at the L4-L5 level.  See above comments for full details of the extent  of disease at each individual level (particularly the L4-L5 level).  3. Mild thickening and tortuosity of centrally located descending nerve roots is noted at the L4-L5 level.  These findings are nonspecific but can be seen with chronic arachnoiditis related to a chronic traction injury secondary to the stenosis of the vertebral canal at this level.        Electronically signed by: Abi Neely  Date:                                            06/08/2023  Time:                                           08:52         Assessment/Plan:       1. Lumbar back pain with radiculopathy affecting left lower extremity  - dexAMETHasone injection 8 mg in clinic  Start- gabapentin (NEURONTIN) 100 MG capsule; Take 1 capsule (100 mg total) by mouth 3 (three) times daily. for 10 days  Dispense: 30 capsule; Refill: 0  Rx - HYDROcodone-acetaminophen (NORCO) 5-325 mg per tablet; Take 1 tablet by mouth every 6 (six) hours as needed for Pain.  Dispense: 20 tablet; Refill: 0    2. Abnormal MRI, lumbar spine  - Ambulatory referral/consult to Neurosurgery.       7/5/2023 - Addendum  Patient called and stated she saw the neurosurgeon and they said I need a GYN evaluation.   Please see patient's CT abd/pelvis 5/23, US pelvis 6/23, MRI L-spine 6/23, DEXA bone scan 4/23, Lumbar xray 8/21 and left hip xray from 8/21.     X-Ray Hip 2 or 3 views Left (with Pelvis when performed)  Order: [id number]  Status: Final result     Visible to patient: Yes (not seen)     Next appt: 08/24/2023 at 03:30 PM in Family Medicine (Kalie Little NP)     0 Result Notes  Details    Reading Physician Reading Date Result Priority   Abi Neely III, MD  [phone] 8/3/2021      Narrative & Impression  STUDY:  Xray left hip (2 views)         [id number] 12:23 PM 8/3/2021:    CURRENT CLINICAL HISTORY: OP    X 5 DAYS    -LEFT HIP PAIN RADIATING DOWN LEFT LEG        PAST MEDICAL HISTORY: N/A        TECHNIQUE: 2 VIEWS OF LEFT HIP        TECHNOLOGIST: NIMO/ADAM                 COMPARISON: none            FINDINGS:        Moderate degenerative changes noted involving the left SI joint and left hip including moderate joint space narrowing and moderate subchondral sclerosis with minimal hypertrophic spur formation.  I see no definite displaced fractures, dislocations, or   other significant abnormalities.            IMPRESSION:        1.   Chronic changes are present as described above.                   Specimen Collected: 08/03/21 00:00 Last Resulted: 08/03/21 00:00            X-Ray Lumbar Spine 5 View  Order: [id number]  Status: Final result     Visible to patient: Yes (not seen)     Next appt: 08/24/2023 at 03:30 PM in Family Medicine (Kalie Little NP)     0 Result Notes  Details    Reading Physician Reading Date Result Priority   Abi Neely III, MD  [phone] 6/16/2021      Narrative & Impression  STUDY: Xray lumbar  spine (4-5  views)         [id number] 2:52 PM 6/16/2021:    CURRENT CLINICAL HX: OP    X2-3 YEARS    -LOWER BACK PAIN RADIATING DOWN BLE    -BLE NUMBNESS        PMH: DENIES        TECHNIQUE: 4-5 VIEWS LUMBAR SPINE        TECH: KGR                 COMPARISON: 6/11/2019            FINDINGS:           MISCELLANEOUS: There is mild demineralization skeletal structures with a mild, levoconvex, scoliotic curvature of the lumbar spine.  Moderate disc space narrowing is noted throughout the lumbar spine and most levels with moderate uncovertebral and to   a lesser extent vertebral body spurring noted throughout the lumbar spine.  Atherosclerotic calcifications are noted within the abdominal aorta and its primary branches.  Postoperative changes noted within the right upper quadrant.           FRACTURES:   No definite acute fractures noted.           ALIGNMENT: There is 5-6 mm of anterior spondylolisthesis of L5 on S1 which I suspect is secondary ligamentous laxity related the patient's degenerative disease.           SOFT TISSUE:  There is no focal soft tissue swelling or  paravertebral hematomas noted.            IMPRESSION:        1.    Chronic appearing changes are present as described above in miscellaneous and alignment.  See above comments   DXA Bone Density Axial Skeleton 1 or more sites  Order: [id number]  Status: Final result     Visible to patient: Yes (not seen)     Next appt: 08/24/2023 at 03:30 PM in Family Medicine (Kalie Little NP)     Dx: Age-related osteoporosis without curr...     1 Result Note    1 HM Topic  Details    Reading Physician Reading Date Result Priority   Chaz Dan MD  [phone] 4/13/2023 Routine     Narrative & Impression  EXAMINATION:  DXA BONE DENSITY AXIAL SKELETON 1 OR MORE SITES     CLINICAL HISTORY:  Age-related osteoporosis without current pathological fracture     COMPARISON:  No reference studies are available for comparison.     FINDINGS:  BMD     T-score     1.274 0.6.  Lumbar Spine (L1-L4)     0.863 -1.3.  Left Femur Neck     0.903 -0.8.  Total Left Femur     Impression:     Osteopenia based on the left femoral neck.  Moderately increased fracture risk.        Electronically signed by: Chaz Dan  Date:                                            04/13/2023  Time:                                           15:08   MRI Lumbar Spine Without Contrast  Order: [id number]  Status: Final result     Visible to patient: Yes (seen)     Next appt: 08/24/2023 at 03:30 PM in Family Medicine (Kalie Little NP)     Dx: Dorsalgia, unspecified     0 Result Notes  Details    Reading Physician Reading Date Result Priority   Abi Neely III, MD  [phone] 6/8/2023 Routine     Narrative & Impression  EXAMINATION:  STUDY: MRI LUMBAR SPINE WITHOUT CONTRAST     CLINICAL HISTORY AND TECHNIQUE:  RT Rosaura on 6/8/2023  8:37 AM     MRI L-SPINE W/O     LT GROIN PAIN RADIATING DOWN LEG X 6 WEEKS     ORDERING DR LITTLE     SEQUENCES  SAG T1 , T2 , T2 FSAT     COR T2     AX T2     TECH  SD     COMPARISON:  None     FINDINGS:  Multiplanar imaging  through the lumbar spine using multiple sequences was performed.  A moderate, levoconvex, scoliotic curvature of the lumbar spine is present and makes evaluation much more difficult.  No abnormal signal intensity is noted within the thecal sac, descending or exiting nerve roots.  I see no evidence of acute fractures or clinically significant spondylolisthesis. There is no focal soft tissue swelling or paravertebral hematomas. There is mild to moderate thickening and tortuosity of centrally located descending nerve roots at the L4-L5 level.     L1-L2:  Moderate facet joint to lesser extent vertebral body spurring is noted at this level with mild hypertrophy of ligamentum flavum.  There is no significant disc bulging or herniation in the vertebral canal, lateral recesses and neural foramina appear adequately patent.     L2-L3:  There is moderate disc space narrowing with mild to moderate generalized disc bulging.  Moderate vertebral body and facet joint spurring are noted with mild hypertrophy of ligamentum flavum.  There is mild narrowing of the vertebral canal and lateral recesses and to a lesser extent the neural foramina with no significant impingement upon descending or exiting nerve roots noted.     L3-L4:  Moderate facet joint to lesser extent vertebral body spurring are noted at this level with moderate hypertrophy of ligamentum flavum and mild to moderate generalized disc bulging.  The combination of changes results in moderate narrowing of the vertebral canal and lateral recesses with moderate impingement upon the descending L4 nerve roots within both lateral recesses (more pronounced on the right).  There is also moderate narrowing of the right neural foramina and I suspect at least mild impingement upon the exiting nerve root with changes in position such as flexion, extension, and twisting.     L4-L5:  Extensive facet joint and mild to moderate facet joint spurring are noted at this level with moderate  hypertrophy of ligamentum flavum and moderate posterior disc bulging.  There is 3-4 mm of anterior spondylolisthesis of L4 on L5 which I suspect is secondary to ligamentous laxity related to the patient's extensive degenerative changes.  The combination of changes results in significant narrowing of the vertebral canal and both lateral recesses with significant impingement upon centrally located descending nerve roots as well as the descending L5 nerve roots within both lateral recesses.  There is at least moderate narrowing of both neural foramina with mild to moderate impingement upon the exiting nerve roots within both neural foramina.     L5-S1:  There is moderate disc space narrowing with moderate vertebral body and facet joint spurring and moderate generalized disc bulging.  The combination of changes results in moderate narrowing of the left lateral recess with mild to moderate impingement upon the descending left S1 nerve root.  There is moderate narrowing of the left neural foramina with moderate impingement upon the exiting nerve root.  There is minimal narrowing of the right neural foramina with minimal impingement upon the exiting nerve root.     Impression:     1. A moderate, levoconvex, scoliotic curvature of the lumbar spine is present making evaluation more difficult.  2. Fairly extensive degenerative changes are noted throughout the lumbar spine and are most pronounced at the L4-L5 level.  I suspect the patient is symptomatic at the L2 through S1 levels and most symptomatic at the L4-L5 level.  See above comments for full details of the extent of disease at each individual level (particularly the L4-L5 level).  3. Mild thickening and tortuosity of centrally located descending nerve roots is noted at the L4-L5 level.  These findings are nonspecific but can be seen with chronic arachnoiditis related to a chronic traction injury secondary to the stenosis of the vertebral canal at this level.         Electronically signed by: Abi Neely  Date:                                            06/08/2023  Time:                                           08:52           Exam Ended: 06/08/23 08:45 Last Resulted: 06/08/23 08:52       Order Details     View Encounter     Lab and Collection Details     Routing     Result History     View Encounter Conversation           Result Care Coordination      Patient Communication     Add Comments   Seen Back to Top             MRI Lumbar Spine Without Contrast: Patient Communication     Add Comments   Seen     External Result Report    External Result Report     Narrative & Impression    EXAMINATION:  STUDY: MRI LUMBAR SPINE WITHOUT CONTRAST     CLINICAL HISTORY AND TECHNIQUE:  RT Rosaura on 6/8/2023  8:37 AM     MRI L-SPINE W/O     LT GROIN PAIN RADIATING DOWN LEG X 6 WEEKS     ORDERING DR AVALOS     SEQUENCES  SAG T1 , T2 , T2 FSAT     COR T2     AX T2     TECH  SD     COMPARISON:  None     FINDINGS:  Multiplanar imaging through the lumbar spine using multiple sequences was performed.  A moderate, levoconvex, scoliotic curvature of the lumbar spine is present and makes evaluation much more difficult.  No abnormal signal intensity is noted within the thecal sac, descending or exiting nerve roots.  I see no evidence of acute fractures or clinically significant spondylolisthesis. There is no focal soft tissue swelling or paravertebral hematomas. There is mild to moderate thickening and tortuosity of centrally located descending nerve roots at the L4-L5 level.     L1-L2:  Moderate facet joint to lesser extent vertebral body spurring is noted at this level with mild hypertrophy of ligamentum flavum.  There is no significant disc bulging or herniation in the vertebral canal, lateral recesses and neural foramina appear adequately patent.     L2-L3:  There is moderate disc space narrowing with mild to moderate generalized disc bulging.  Moderate vertebral body and facet joint  spurring are noted with mild hypertrophy of ligamentum flavum.  There is mild narrowing of the vertebral canal and lateral recesses and to a lesser extent the neural foramina with no significant impingement upon descending or exiting nerve roots noted.     L3-L4:  Moderate facet joint to lesser extent vertebral body spurring are noted at this level with moderate hypertrophy of ligamentum flavum and mild to moderate generalized disc bulging.  The combination of changes results in moderate narrowing of the vertebral canal and lateral recesses with moderate impingement upon the descending L4 nerve roots within both lateral recesses (more pronounced on the right).  There is also moderate narrowing of the right neural foramina and I suspect at least mild impingement upon the exiting nerve root with changes in position such as flexion, extension, and twisting.     L4-L5:  Extensive facet joint and mild to moderate facet joint spurring are noted at this level with moderate hypertrophy of ligamentum flavum and moderate posterior disc bulging.  There is 3-4 mm of anterior spondylolisthesis of L4 on L5 which I suspect is secondary to ligamentous laxity related to the patient's extensive degenerative changes.  The combination of changes results in significant narrowing of the vertebral canal and both lateral recesses with significant impingement upon centrally located descending nerve roots as well as the descending L5 nerve roots within both lateral recesses.  There is at least moderate narrowing of both neural foramina with mild to moderate impingement upon the exiting nerve roots within both neural foramina.     L5-S1:  There is moderate disc space narrowing with moderate vertebral body and facet joint spurring and moderate generalized disc bulging.  The combination of changes results in moderate narrowing of the left lateral recess with mild to moderate impingement upon the descending left S1 nerve root.  There is moderate  narrowing of the left neural foramina with moderate impingement upon the exiting nerve root.  There is minimal narrowing of the right neural foramina with minimal impingement upon the exiting nerve root.     Impression:     1. A moderate, levoconvex, scoliotic curvature of the lumbar spine is present making evaluation more difficult.  2. Fairly extensive degenerative changes are noted throughout the lumbar spine and are most pronounced at the L4-L5 level.  I suspect the patient is symptomatic at the L2 through S1 levels and most symptomatic at the L4-L5 level.  See above comments for full details of the extent of disease at each individual level (particularly the L4-L5 level).  3. Mild thickening and tortuosity of centrally located descending nerve roots is noted at the L4-L5 level.  These findings are nonspecific but can be seen with chronic arachnoiditis related to a chronic traction injury secondary to the stenosis of the vertebral canal at this level.        Electronically signed by: Abi Neely  Date:                                            06/08/2023      US Pelvis Complete Non OB  Order: [id number]  Status: Final result     Visible to patient: Yes (seen)     Next appt: 08/24/2023 at 03:30 PM in Family Medicine (Kalie Little NP)     Dx: Pelvic pain in female     0 Result Notes  Details    Reading Physician Reading Date Result Priority   Abi Neely III, MD  [phone] 6/2/2023 Routine     Narrative & Impression  EXAMINATION:  STUDY: US PELVIS COMPLETE NON OB     CLINICAL HISTORY AND TECHNIQUE:  RT Gladis on 6/2/2023  9:57 AM     CLINICAL HX: -OP- LEFT SIDED PELVIC/GROIN PAIN X 1 MONTH     PMH: HYSTERECTOMY, BILAT OOPHORECTOMY     TECHNIQUE: 2D TRANSABDOMINAL PELVIC ULTRASOUND WITH COLOR MAPPING     TECH: GRS     COMPARISON:  None     FINDINGS:  Uterus:   The patient is post hysterectomy.     Ovaries: The patient is post bilateral oophorectomy.     Miscellaneous: No worrisome solid or cystic  masses or other sonographic abnormalities are appreciated.  The urinary bladder is fairly well distended and appears unremarkable.  The patient was not significantly tender while scanning over the pelvis and adnexal regions.     Impression:     1. The patient is post hysterectomy and bilateral oophorectomy with no worrisome abnormalities noted.  See above comments.        Electronically signed by: Abi Neely  Date:                                            06/02/2023  Time:                                           10:00           Exam Ended: 06/02/23 09:55 Last Resulted: 06/02/23 10:00         CT Abdomen Pelvis  Without Contrast  Order: [id number]  Status: Final result     Visible to patient: Yes (seen)     Next appt: 08/24/2023 at 03:30 PM in Family Medicine (Kalie Little NP)     Dx: Kidney stone; Suprapubic tenderness     0 Result Notes  Details    Reading Physician Reading Date Result Priority   Abi Neely III, MD  [phone] 5/25/2023 Routine     Narrative & Impression  EXAMINATION:  CT ABDOMEN PELVIS WITHOUT CONTRAST     CLINICAL HISTORY AND TECHNIQUE:  Lorenzo Marsh, RT on 5/25/2023 11:17 AM     PATIENT STATUS : OP     PROCEDURE: CT ABD/PEL W/O     CLINICAL HX: RT FLANK PAIN, SUPRAPUBIC PAIN X 3 WKS     PMH:  SCIATIC NERVE PAIN, HTN,BRANDI, APPY, HYST     IV CONTRAST: NONE     ORAL CONTRAST: NONE     RECTAL CONTRAST:NONE     AXIAL IMAGES @ 5MM INTERVALS WITH MULTI PLANAR RECONSTRUCTION OF IMAGES     TOTAL IMAGE NUMBER :  154     NUMBER OF CT SCANS LAST 12 MONTHS : 2     CTDIvol(mGy) : HEAD:        BODY: 6.10     DLP (mGycm) : HEAD :       BODY: 315.90     TECH : DB     PT TRANSPORTED W/O INCIDENT     This patient has had two CT and nuclear medicine scans performed within the last 12 months.     The following DOSE REDUCTION TECHNIQUES are used for all CT scans at [facility]:     1. Automated exposure control.  2. Adjustment of the mA and/or kv according to patient size.  3. Use of  iterative reconstruction technique.     COMPARISON:  None     FINDINGS:  Liver: No clinically significant abnormalities are noted.     Gallbladder/biliary system: Previous cholecystectomy.     Spleen: No clinically significant abnormalities are noted.     Adrenal glands: A 2.3 cm, hypodense nodule is noted originating from the left adrenal gland and I suspect represents a benign adenoma.  No additional adrenal abnormalities are noted.     Pancreas: No clinically significant abnormalities are noted.     Kidneys/ureters: A 6-7 mm, nonobstructing stone/nephrolithiasis is noted within 1 of the mid pole calices of the right kidney.  I see no obvious ureteral stones or changes to suggest ureteral obstruction.     Urinary bladder: No clinically significant abnormalities are noted.     Uterus and ovaries: The patient is post hysterectomy.     GI tract: Numerous diverticuli are noted originating from the sigmoid colon with no significant inflammatory changes of diverticulitis noted.  Unopacified loops of large and small bowel as well as the gastric lumen are otherwise difficult to evaluate with no definite abnormalities appreciated.  The appendix is not visualized and surgical clip is noted adjacent to the cecum compatible with patient's history of a prior appendectomy.     Vascular structures: Extensive atherosclerotic plaquing is noted throughout the abdominal aorta and its primary branches.     Musculoskeletal structures: There is mild demineralization of the skeletal structures with a moderate, levoconvex, scoliotic curvature of the lumbar spine and moderate degenerative changes noted throughout the lumbar spine.     Miscellaneous: N/A     Impression:     1. Numerous diverticuli are noted originating from the sigmoid colon with no significant inflammatory changes of diverticulitis appreciated.  2. A 6-7 mm, nonobstructing stone/nephrolithiasis is noted within the mid pole calices of the right kidney.  No evidence of  ureteral obstruction is noted.  3. A 2.3 cm, hypodense nodule is noted originating from the left adrenal gland and I suspect represents a benign adenoma.  Clinical correlation is recommended.  4. Chronic changes are present as described above.  See above comments.        Electronically signed by: Abi Neely  Date:                                            05/25/2023  Time:                                           11:25

## 2023-06-02 ENCOUNTER — HOSPITAL ENCOUNTER (OUTPATIENT)
Dept: RADIOLOGY | Facility: HOSPITAL | Age: 68
Discharge: HOME | End: 2023-06-02
Attending: NURSE PRACTITIONER
Payer: MEDICARE

## 2023-06-02 DIAGNOSIS — R10.2 PELVIC PAIN IN FEMALE: ICD-10-CM

## 2023-06-02 PROCEDURE — 76856 US EXAM PELVIC COMPLETE: CPT | Mod: TC

## 2023-06-07 ENCOUNTER — TELEPHONE (OUTPATIENT)
Dept: FAMILY MEDICINE | Facility: CLINIC | Age: 68
End: 2023-06-07
Payer: MEDICARE

## 2023-06-07 DIAGNOSIS — M25.561 CHRONIC PAIN OF BOTH KNEES: ICD-10-CM

## 2023-06-07 DIAGNOSIS — M54.16 LUMBAR BACK PAIN WITH RADICULOPATHY AFFECTING LEFT LOWER EXTREMITY: ICD-10-CM

## 2023-06-07 DIAGNOSIS — M25.521 RIGHT ELBOW PAIN: ICD-10-CM

## 2023-06-07 DIAGNOSIS — G89.29 CHRONIC PAIN OF BOTH KNEES: ICD-10-CM

## 2023-06-07 DIAGNOSIS — M25.531 RIGHT WRIST PAIN: ICD-10-CM

## 2023-06-07 DIAGNOSIS — M25.562 CHRONIC PAIN OF BOTH KNEES: ICD-10-CM

## 2023-06-07 DIAGNOSIS — M54.9 DORSALGIA, UNSPECIFIED: Primary | ICD-10-CM

## 2023-06-07 NOTE — TELEPHONE ENCOUNTER
"----- Message from Amber Khan sent at 6/7/2023 11:57 AM CDT -----  Regarding: return call      Pt requests a return call regarding US last Friday as well as pending MRI.    States "I have been in pain for 6 weeks now"    Pt states she is unaware of the referrals and what she is meant to be waiting on    [phone]    "

## 2023-06-07 NOTE — TELEPHONE ENCOUNTER
Plain film xrays reviewed from 2019 and 2021 - degenerative changes.  Pelvic US unremarkable. Referral ordered 5/24/2023 for rheumatology.   MRI L-spine ordered - pending insurance approval.  Nerve study ordered.

## 2023-06-08 ENCOUNTER — HOSPITAL ENCOUNTER (OUTPATIENT)
Dept: RADIOLOGY | Facility: HOSPITAL | Age: 68
Discharge: HOME | End: 2023-06-08
Attending: NURSE PRACTITIONER
Payer: MEDICARE

## 2023-06-08 DIAGNOSIS — M54.9 DORSALGIA, UNSPECIFIED: ICD-10-CM

## 2023-06-08 PROCEDURE — 72148 MRI LUMBAR SPINE W/O DYE: CPT | Mod: TC

## 2023-06-08 RX ORDER — HYDROCODONE BITARTRATE AND ACETAMINOPHEN 5; 325 MG/1; MG/1
1 TABLET ORAL EVERY 6 HOURS PRN
Qty: 20 TABLET | Refills: 0 | Status: SHIPPED | OUTPATIENT
Start: 2023-06-08 | End: 2023-06-14 | Stop reason: SDUPTHER

## 2023-06-08 NOTE — TELEPHONE ENCOUNTER
----- Message from Kalie Little NP sent at 6/8/2023  2:54 PM CDT -----  Please let patient know MRI L-spine showed significant degenerative changes and the need for neurosurgeon evaluation. Is she is ok with referral and does it matter what time or with who?

## 2023-06-13 ENCOUNTER — TELEPHONE (OUTPATIENT)
Dept: FAMILY MEDICINE | Facility: CLINIC | Age: 68
End: 2023-06-13
Payer: MEDICARE

## 2023-06-13 NOTE — TELEPHONE ENCOUNTER
----- Message from Amber Khan sent at 6/13/2023  4:40 PM CDT -----  Regarding: refill      Pt requests more pain meds to last until her neuro appt on June 30th

## 2023-06-14 DIAGNOSIS — M54.16 LUMBAR BACK PAIN WITH RADICULOPATHY AFFECTING LEFT LOWER EXTREMITY: ICD-10-CM

## 2023-06-14 RX ORDER — HYDROCODONE BITARTRATE AND ACETAMINOPHEN 5; 325 MG/1; MG/1
1 TABLET ORAL EVERY 6 HOURS PRN
Qty: 20 TABLET | Refills: 0 | Status: SHIPPED | OUTPATIENT
Start: 2023-06-14 | End: 2023-08-29 | Stop reason: ALTCHOICE

## 2023-06-14 NOTE — TELEPHONE ENCOUNTER
"----- Message from Amber Khan sent at 6/14/2023  3:58 PM CDT -----  Regarding: return call      "I cannot go to the end of the month. This is ridiculous. Still havent heard nothing. No one is calling me."    "

## 2023-06-22 ENCOUNTER — TELEPHONE (OUTPATIENT)
Dept: FAMILY MEDICINE | Facility: CLINIC | Age: 68
End: 2023-06-22
Payer: MEDICARE

## 2023-06-22 DIAGNOSIS — M54.16 LUMBAR BACK PAIN WITH RADICULOPATHY AFFECTING LEFT LOWER EXTREMITY: ICD-10-CM

## 2023-06-22 DIAGNOSIS — M54.16 LUMBAR BACK PAIN WITH RADICULOPATHY AFFECTING LEFT LOWER EXTREMITY: Primary | ICD-10-CM

## 2023-06-22 RX ORDER — GABAPENTIN 100 MG/1
100 CAPSULE ORAL 3 TIMES DAILY
Qty: 30 CAPSULE | Refills: 2 | Status: SHIPPED | OUTPATIENT
Start: 2023-06-22 | End: 2023-06-26 | Stop reason: SDUPTHER

## 2023-06-22 NOTE — TELEPHONE ENCOUNTER
----- Message from Amber Khan sent at 6/21/2023  2:58 PM CDT -----  Regarding: return call      Pt called to ask where to get an EMG since her neuro needs one. I told her I would leave a message for you.     She called back an hour later stating that she called Dr Mejia's office and they said they do EMGs. They need us to send the order for it so that she can go to Dr Mejia in the morning and get an EMG and then go to Dr Pond so that he can see her as a pt      I told her you were out of the office and that you'd be back at 8am tomorrow so if she doesn't hear from you by noon, to give me a call. She said she could not wait that long.     [phone]    
Can we please order an EMG for dr holcomb office. I will fax once done.   
Reordered EMG with details for Dr Mejia office
There are no Wet Read(s) to document.

## 2023-06-26 DIAGNOSIS — M54.16 LUMBAR BACK PAIN WITH RADICULOPATHY AFFECTING LEFT LOWER EXTREMITY: ICD-10-CM

## 2023-06-26 RX ORDER — GABAPENTIN 100 MG/1
100 CAPSULE ORAL 3 TIMES DAILY
Qty: 30 CAPSULE | Refills: 2 | Status: SHIPPED | OUTPATIENT
Start: 2023-06-26 | End: 2023-07-26

## 2023-07-05 DIAGNOSIS — R10.32 LEFT GROIN PAIN: ICD-10-CM

## 2023-07-05 DIAGNOSIS — R52 PAIN IN SURGICAL SCAR: Primary | ICD-10-CM

## 2023-07-05 DIAGNOSIS — L90.5 PAIN IN SURGICAL SCAR: Primary | ICD-10-CM

## 2023-07-07 ENCOUNTER — HOSPITAL ENCOUNTER (EMERGENCY)
Facility: HOSPITAL | Age: 68
Discharge: HOME | End: 2023-07-07
Attending: FAMILY MEDICINE
Payer: MEDICARE

## 2023-07-07 VITALS
DIASTOLIC BLOOD PRESSURE: 89 MMHG | SYSTOLIC BLOOD PRESSURE: 164 MMHG | BODY MASS INDEX: 25.74 KG/M2 | HEART RATE: 88 BPM | WEIGHT: 164 LBS | RESPIRATION RATE: 16 BRPM | OXYGEN SATURATION: 95 % | HEIGHT: 67 IN

## 2023-07-07 DIAGNOSIS — M54.42 CHRONIC LEFT-SIDED LOW BACK PAIN WITH LEFT-SIDED SCIATICA: Primary | ICD-10-CM

## 2023-07-07 DIAGNOSIS — G89.29 CHRONIC LEFT-SIDED LOW BACK PAIN WITH LEFT-SIDED SCIATICA: Primary | ICD-10-CM

## 2023-07-07 PROCEDURE — 96372 THER/PROPH/DIAG INJ SC/IM: CPT | Performed by: FAMILY MEDICINE

## 2023-07-07 PROCEDURE — 99284 EMERGENCY DEPT VISIT MOD MDM: CPT

## 2023-07-07 PROCEDURE — 63600175 PHARM REV CODE 636 W HCPCS: Performed by: FAMILY MEDICINE

## 2023-07-07 RX ORDER — DEXAMETHASONE SODIUM PHOSPHATE 4 MG/ML
8 INJECTION, SOLUTION INTRA-ARTICULAR; INTRALESIONAL; INTRAMUSCULAR; INTRAVENOUS; SOFT TISSUE
Status: COMPLETED | OUTPATIENT
Start: 2023-07-07 | End: 2023-07-07

## 2023-07-07 RX ORDER — KETOROLAC TROMETHAMINE 30 MG/ML
60 INJECTION, SOLUTION INTRAMUSCULAR; INTRAVENOUS
Status: COMPLETED | OUTPATIENT
Start: 2023-07-07 | End: 2023-07-07

## 2023-07-07 RX ORDER — DEXAMETHASONE 4 MG/1
4 TABLET ORAL DAILY
Qty: 5 TABLET | Refills: 0 | Status: SHIPPED | OUTPATIENT
Start: 2023-07-07 | End: 2023-07-12

## 2023-07-07 RX ADMIN — KETOROLAC TROMETHAMINE 60 MG: 30 INJECTION, SOLUTION INTRAMUSCULAR at 06:07

## 2023-07-07 RX ADMIN — DEXAMETHASONE SODIUM PHOSPHATE 8 MG: 4 INJECTION, SOLUTION INTRA-ARTICULAR; INTRALESIONAL; INTRAMUSCULAR; INTRAVENOUS; SOFT TISSUE at 06:07

## 2023-07-07 NOTE — ED TRIAGE NOTES
Pt reports left hip soreness that began last night. Pt reports hx pinched nerve on left side, taking Gautam 300mg (last dose taken 7/7/23 @ 0400). Pt reports that she has had history of pain in left groin area described as a burning and has several follow up appointments but they are 6+ weeks away. Pt states that this pain is more of a soreness and feels a bit different than her usual left groin pain. Denies injury. Pt able to ambulate without assistance or difficulty.

## 2023-07-12 ENCOUNTER — LAB VISIT (OUTPATIENT)
Dept: LAB | Facility: HOSPITAL | Age: 68
End: 2023-07-12
Attending: NURSE PRACTITIONER
Payer: MEDICARE

## 2023-07-12 DIAGNOSIS — D35.00 BENIGN NEOPLASM OF ADRENAL GLAND, UNSPECIFIED LATERALITY: Primary | ICD-10-CM

## 2023-07-12 LAB
ANION GAP SERPL CALC-SCNC: 5 MEQ/L (ref 2–13)
BUN SERPL-MCNC: 23 MG/DL (ref 7–20)
CALCIUM SERPL-MCNC: 9.6 MG/DL (ref 8.4–10.2)
CHLORIDE SERPL-SCNC: 106 MMOL/L (ref 98–110)
CO2 SERPL-SCNC: 26 MMOL/L (ref 21–32)
CREAT SERPL-MCNC: 0.89 MG/DL (ref 0.66–1.25)
CREAT/UREA NIT SERPL: 26 (ref 12–20)
GFR SERPLBLD CREATININE-BSD FMLA CKD-EPI: 70 MLS/MIN/1.73/M2
GLUCOSE SERPL-MCNC: 134 MG/DL (ref 70–115)
POTASSIUM SERPL-SCNC: 4.2 MMOL/L (ref 3.5–5.1)
SODIUM SERPL-SCNC: 137 MMOL/L (ref 135–145)

## 2023-07-12 PROCEDURE — 83835 ASSAY OF METANEPHRINES: CPT

## 2023-07-12 PROCEDURE — 84244 ASSAY OF RENIN: CPT

## 2023-07-12 PROCEDURE — 80048 BASIC METABOLIC PNL TOTAL CA: CPT

## 2023-07-12 PROCEDURE — 36415 COLL VENOUS BLD VENIPUNCTURE: CPT

## 2023-07-12 PROCEDURE — 82627 DEHYDROEPIANDROSTERONE: CPT

## 2023-07-13 ENCOUNTER — OFFICE VISIT (OUTPATIENT)
Dept: FAMILY MEDICINE | Facility: CLINIC | Age: 68
End: 2023-07-13
Payer: MEDICARE

## 2023-07-13 VITALS
HEIGHT: 67 IN | WEIGHT: 164 LBS | OXYGEN SATURATION: 98 % | HEART RATE: 73 BPM | BODY MASS INDEX: 25.74 KG/M2 | SYSTOLIC BLOOD PRESSURE: 142 MMHG | DIASTOLIC BLOOD PRESSURE: 80 MMHG | TEMPERATURE: 99 F

## 2023-07-13 DIAGNOSIS — M54.42 CHRONIC LEFT-SIDED LOW BACK PAIN WITH LEFT-SIDED SCIATICA: Primary | ICD-10-CM

## 2023-07-13 DIAGNOSIS — G89.29 CHRONIC LEFT-SIDED LOW BACK PAIN WITH LEFT-SIDED SCIATICA: Primary | ICD-10-CM

## 2023-07-13 DIAGNOSIS — D35.00 ADRENAL ADENOMA, UNSPECIFIED LATERALITY: Primary | ICD-10-CM

## 2023-07-13 LAB — DHEA-S SERPL-MCNC: 6.5 MCG/DL (ref 9.7–159)

## 2023-07-13 PROCEDURE — 96372 THER/PROPH/DIAG INJ SC/IM: CPT | Mod: ,,, | Performed by: NURSE PRACTITIONER

## 2023-07-13 PROCEDURE — 96372 PR INJECTION,THERAP/PROPH/DIAG2ST, IM OR SUBCUT: ICD-10-PCS | Mod: ,,, | Performed by: NURSE PRACTITIONER

## 2023-07-13 PROCEDURE — 99214 OFFICE O/P EST MOD 30 MIN: CPT | Mod: 25,,, | Performed by: NURSE PRACTITIONER

## 2023-07-13 PROCEDURE — 99214 PR OFFICE/OUTPT VISIT, EST, LEVL IV, 30-39 MIN: ICD-10-PCS | Mod: 25,,, | Performed by: NURSE PRACTITIONER

## 2023-07-13 RX ORDER — KETOROLAC TROMETHAMINE 30 MG/ML
60 INJECTION, SOLUTION INTRAMUSCULAR; INTRAVENOUS
Status: COMPLETED | OUTPATIENT
Start: 2023-07-13 | End: 2023-07-13

## 2023-07-13 RX ORDER — DEXAMETHASONE SODIUM PHOSPHATE 4 MG/ML
8 INJECTION, SOLUTION INTRA-ARTICULAR; INTRALESIONAL; INTRAMUSCULAR; INTRAVENOUS; SOFT TISSUE
Status: COMPLETED | OUTPATIENT
Start: 2023-07-13 | End: 2023-07-13

## 2023-07-13 RX ORDER — GABAPENTIN 300 MG/1
CAPSULE ORAL
COMMUNITY
Start: 2023-06-20

## 2023-07-13 RX ADMIN — KETOROLAC TROMETHAMINE 60 MG: 30 INJECTION, SOLUTION INTRAMUSCULAR; INTRAVENOUS at 10:07

## 2023-07-13 RX ADMIN — DEXAMETHASONE SODIUM PHOSPHATE 8 MG: 4 INJECTION, SOLUTION INTRA-ARTICULAR; INTRALESIONAL; INTRAMUSCULAR; INTRAVENOUS; SOFT TISSUE at 10:07

## 2023-07-13 NOTE — PROGRESS NOTES
Subjective:       Patient ID: Brandie Lobato is a 68 y.o. female.    Chief Complaint: Back Pain    HPI  Patient presents to the clinic with left lower back, left hip and thigh pain. Denies known trauma, injuries/fever, chills, difficulty urinating, difficulty with BM, near fall.   Patient has known degenerative disease by L-spine MRI 7/2023. Referred to neurosurgeon Dr Pond in  for evaluation. Reports he wanted to do surgery and patient declined. He also offered in-office LESI. Patient states she thought about it and plans to contact him to procedure with procedure.     Labs from November 15, 2022  Glucose 99  BUN 15  Creatinine 0.6  GFR 94.8  Sodium 142  Potassium 4.0  ALT 15  AST 20  Alk phos 74  Cholesterol 189 HDL 39  .7  Triglycerides 126  TSH 1.70  Vitamin D32.3  A1c 5.5  WBC 8.1  Hemoglobin 0.6  Hematocrit 44.1  Platelets 176    Labs from April 26, 2022  Glucose 94  BUN 14  Creatinine 0.60    Sodium 141  Potassium 4.4  ALT 14  AST 21  Alk phos 69  Cholesterol 195    HDL 38  Triglycerides 142  TSH 2.34  Vitamin D 23 0.8  WBC 8.1  Hemoglobin 14.5  Hematocrit 43.5  Platelets 185    Past Medical History:   Diagnosis Date    Fibrocystic breast     Heart murmur     Hypercholesteremia     Hypertension     Impaired fasting blood sugar     Insomnia     Mitral regurgitation     Vitamin D deficiency      Social History     Tobacco Use    Smoking status: Every Day     Packs/day: 1.00     Types: Cigarettes    Smokeless tobacco: Never   Substance Use Topics    Alcohol use: Not Currently    Drug use: Never     Past Surgical History:   Procedure Laterality Date    ADENOIDECTOMY      CHOLECYSTECTOMY      HYSTERECTOMY       History reviewed. No pertinent family history.    Review of Systems   -see HPI  Objective:      Physical Exam  Vitals and nursing note reviewed.   Constitutional:       General: She is not in acute distress.     Appearance: Normal appearance. She is normal weight. She is not  toxic-appearing.   HENT:      Head: Normocephalic and atraumatic.      Nose: Nose normal.      Mouth/Throat:      Mouth: Mucous membranes are moist.      Pharynx: Oropharynx is clear.   Eyes:      Extraocular Movements: Extraocular movements intact.      Conjunctiva/sclera: Conjunctivae normal.      Pupils: Pupils are equal, round, and reactive to light.   Cardiovascular:      Rate and Rhythm: Normal rate and regular rhythm.      Heart sounds: Normal heart sounds. No murmur heard.    No friction rub. No gallop.   Pulmonary:      Effort: Pulmonary effort is normal.      Breath sounds: Normal breath sounds.   Musculoskeletal:         General: Normal range of motion.      Cervical back: Normal range of motion and neck supple.   Skin:     General: Skin is warm and dry.      Coloration: Skin is not jaundiced or pale.      Findings: No rash.   Neurological:      General: No focal deficit present.      Mental Status: She is alert and oriented to person, place, and time. Mental status is at baseline.   Psychiatric:         Mood and Affect: Mood normal.         Behavior: Behavior normal.         Thought Content: Thought content normal.         Judgment: Judgment normal.       Vitals:    07/13/23 1030   BP: (!) 142/80   Pulse:    Temp:      Assessment/Plan:     1. Chronic left-sided low back pain with left-sided sciatica  - ketorolac injection 60 mg in clinic  - dexAMETHasone injection 8 mg in clinic    Follow up if symptoms worsen or fail to improve.Call sooner if needed.

## 2023-07-14 ENCOUNTER — HOSPITAL ENCOUNTER (OUTPATIENT)
Dept: RADIOLOGY | Facility: HOSPITAL | Age: 68
Discharge: HOME | End: 2023-07-14
Attending: NURSE PRACTITIONER
Payer: MEDICARE

## 2023-07-14 DIAGNOSIS — D35.00 ADRENAL ADENOMA, UNSPECIFIED LATERALITY: ICD-10-CM

## 2023-07-14 PROCEDURE — 25500020 PHARM REV CODE 255: Performed by: NURSE PRACTITIONER

## 2023-07-14 PROCEDURE — 74183 MRI ABD W/O CNTR FLWD CNTR: CPT | Mod: TC

## 2023-07-14 PROCEDURE — A9577 INJ MULTIHANCE: HCPCS | Performed by: NURSE PRACTITIONER

## 2023-07-14 RX ADMIN — GADOBENATE DIMEGLUMINE 20 ML: 529 INJECTION, SOLUTION INTRAVENOUS at 12:07

## 2023-07-17 LAB
METANEPH FREE SERPL-SCNC: <0.2 NMOL/L
NORMETANEPH FREE SERPL-SCNC: 0.43 NMOL/L
RENIN PLAS-CCNC: 0.7 NG/ML/H

## 2023-08-09 ENCOUNTER — TELEPHONE (OUTPATIENT)
Dept: FAMILY MEDICINE | Facility: CLINIC | Age: 68
End: 2023-08-09
Payer: MEDICARE

## 2023-08-09 NOTE — TELEPHONE ENCOUNTER
Please see other message. Does pt need to see a lung specialist? If so can we put a referral in???

## 2023-08-09 NOTE — TELEPHONE ENCOUNTER
----- Message from Amber Khan sent at 8/9/2023  2:16 PM CDT -----  Regarding: return call      Pt states that she was told by a different provider that could see her chart that she was supposed to have seen a lung specialist. They said there were marked changes in the lung and that she was scheduled for a lung specialist    [phone]

## 2023-08-14 NOTE — TELEPHONE ENCOUNTER
I previously discussed with patient and documented in my noted 5/24/2023 that she needed to be referred to a pulmonologist and she refused referral and refused to quit smoking.  Is patient now agreeable to a referral to pulmonology? If so, we can send a referral. Please ask patient if she has any preferences.

## 2023-08-23 DIAGNOSIS — F51.04 PSYCHOPHYSIOLOGICAL INSOMNIA: ICD-10-CM

## 2023-08-23 RX ORDER — ZOLPIDEM TARTRATE 10 MG/1
10 TABLET ORAL NIGHTLY
Qty: 30 TABLET | Refills: 3 | Status: SHIPPED | OUTPATIENT
Start: 2023-08-23 | End: 2023-08-29 | Stop reason: SDUPTHER

## 2023-08-23 NOTE — TELEPHONE ENCOUNTER
----- Message from Amber Khan sent at 8/23/2023  8:38 AM CDT -----  Regarding: refill      Pt requests a refill on    Ambien 10    @ [facility]    [phone]

## 2023-08-28 NOTE — PROGRESS NOTES
Subjective:       Patient ID: Brandie Lobato is a 68 y.o. female.    Chief Complaint: follow up    HPI  Patient with PMHx of COPD, HTN, HLD, heart murmur, insomnia, tobacco use presents to clinic for 3 month Ambien f/u and referral f/u.   Cardiology referral for risk factors: appt pending this week.   Rheumatology: appt pending with Dr Emmanuel.   Patient reports that she followed back up with Dr Pond and had an LESI in his office x1 with relief.   Patient reports that she was told by another provider that she needs a pulmonary referral. Pulmonary referral was discussed during 5/24/23 office visit. Pt declined at that time.     Labs from November 15, 2022  Glucose 99  BUN 15  Creatinine 0.6  GFR 94.8  Sodium 142  Potassium 4.0  ALT 15  AST 20  Alk phos 74  Cholesterol 189 HDL 39  .7  Triglycerides 126  TSH 1.70  Vitamin D32.3  A1c 5.5  WBC 8.1  Hemoglobin 0.6  Hematocrit 44.1  Platelets 176    Labs from April 26, 2022  Glucose 94  BUN 14  Creatinine 0.60    Sodium 141  Potassium 4.4  ALT 14  AST 21  Alk phos 69  Cholesterol 195    HDL 38  Triglycerides 142  TSH 2.34  Vitamin D 23 0.8  WBC 8.1  Hemoglobin 14.5  Hematocrit 43.5  Platelets 185    Past Medical History:   Diagnosis Date    Fibrocystic breast     Heart murmur     Hypercholesteremia     Hypertension     Impaired fasting blood sugar     Insomnia     Mitral regurgitation     Vitamin D deficiency      Social History     Tobacco Use    Smoking status: Every Day     Current packs/day: 1.00     Types: Cigarettes    Smokeless tobacco: Never   Substance Use Topics    Alcohol use: Not Currently    Drug use: Never     Past Surgical History:   Procedure Laterality Date    ADENOIDECTOMY      CHOLECYSTECTOMY      HYSTERECTOMY       History reviewed. No pertinent family history.    Review of Systems - see HPI  Objective:     Physical Exam  Vitals and nursing note reviewed.   Constitutional:       General: She is not in acute distress.      Appearance: Normal appearance. She is normal weight. She is not toxic-appearing.   HENT:      Head: Normocephalic and atraumatic.      Nose: Nose normal.      Mouth/Throat:      Mouth: Mucous membranes are moist.      Pharynx: Oropharynx is clear.   Eyes:      Extraocular Movements: Extraocular movements intact.      Conjunctiva/sclera: Conjunctivae normal.      Pupils: Pupils are equal, round, and reactive to light.   Cardiovascular:      Rate and Rhythm: Normal rate and regular rhythm.      Heart sounds: Murmur heard.   Pulmonary:      Effort: Pulmonary effort is normal.      Breath sounds: Wheezing (mild, expiratory) present.   Musculoskeletal:         General: Normal range of motion.      Cervical back: Normal range of motion and neck supple.   Skin:     General: Skin is warm and dry.      Coloration: Skin is not jaundiced or pale.      Findings: No rash.   Neurological:      General: No focal deficit present.      Mental Status: She is alert and oriented to person, place, and time. Mental status is at baseline.   Psychiatric:         Mood and Affect: Mood normal.         Behavior: Behavior normal.         Thought Content: Thought content normal.         Judgment: Judgment normal.       Vitals:    08/29/23 1522   BP: 122/62   Pulse: (!) 56   Temp: 97.9 °F (36.6 °C)     No visits with results within 1 Month(s) from this visit.   Latest known visit with results is:   Lab Visit on 07/12/2023   Component Date Value Ref Range Status    Sodium Level 07/12/2023 137  135 - 145 mmol/L Final    Potassium Level 07/12/2023 4.2  3.5 - 5.1 mmol/L Final    Chloride 07/12/2023 106  98 - 110 mmol/L Final    Carbon Dioxide 07/12/2023 26  21 - 32 mmol/L Final    Glucose Level 07/12/2023 134 (H)  70 - 115 mg/dL Final    Blood Urea Nitrogen 07/12/2023 23.0 (H)  7.0 - 20.0 mg/dL Final    Creatinine 07/12/2023 0.89  0.66 - 1.25 mg/dL Final    BUN/Creatinine Ratio 07/12/2023 26 (H)  12 - 20 Final    Calcium Level Total 07/12/2023 9.6   8.4 - 10.2 mg/dL Final    Anion Gap 07/12/2023 5.0  2.0 - 13.0 mEq/L Final    eGFR 07/12/2023 70  mls/min/1.73/m2 Final                         EGFR INTERPRETATION    Beginning 8/15/22 we are reporting the eGFRcr calculation as recommended by the [employer]. The eGFRcr equation has similar overall performance characteristics to the older equation, but the values may differ by more than 10% particularly at higher values of eGFRcr and younger adult ages.    NKF stages of chronic kidney disease (CKD)  Stage 1: Kidney damage with normal or increased eGFR (>90 mL/min/1.73 m^2)  Stage 2: Mild reduction in GFR (60-89 mL/min/1.73 m^2)  Stage 3a: Moderate reduction in GFR (45-59 mL/min/1.73 m^2)  Stage 3b: Moderate reduction in GFR (30-44 mL/min/1.73 m^2)  Stage 4: Severe reduction in GFR (15-29 mL/min/1.73 m^2)  Stage 5: Kidney failure (GFR <15 mL/min/1.73 m^2)        DHEA SULFATE 07/12/2023 6.5 (L)  9.7 - 159 mcg/dL Final       Test Performed by:  [place] [address]  : Lenard Reyes M.D. Ph.D.; CLIA# [id number]    Renin Activity, P 07/12/2023 0.7  ng/mL/h Final       -------------------REFERENCE VALUE--------------------------  (Peripheral vein specimen)  Na-deplete, upright:    Mean: 5.9    Range: 2.9-10.8  Na-replete, upright:    Mean: 1.0    Range: < or =0.6-3.0     -------------------ADDITIONAL INFORMATION-------------------  Testing performed by Liquid Chromatography-Tandem Mass   Spectrometry (LC-MS/MS).  This test was developed and its performance characteristics   determined by [facility] in a manner consistent with CLIA   requirements. This test has not been cleared or approved by   the U.S. Food and Drug Administration.     Test Performed by:  [address] [address]  : Lenard Reyes M.D. Ph.D.; CLIA# [id number]     Normetanephrine, Free 07/12/2023 0.43  <0.90 nmol/L Final    Metanephrine, Free 07/12/2023 <0.20  <0.50 nmol/L Final       -------------------ADDITIONAL INFORMATION-------------------  This test was developed and its performance characteristics   determined by [facility] in a manner consistent with CLIA   requirements. This test has not been cleared or approved by   the U.S. Food and Drug Administration.     Test Performed by:  [facility]  [address] [id number]  : Lenard Reyes M.D. Ph.D.; CLIA# [id number]   CT Chest Lung Screening Low Dose  Order: [id number]  Dx: Personal history of nicotine dependen...     1 Result Note  Details    Reading Physician Reading Date Result Priority   Abi Neely III, MD  [phone] 4/3/2023 Routine     Narrative & Impression  EXAMINATION:  STUDY:CT CHEST LUNG SCREENING LOW DOSE  CLINICAL HISTORY AND TECHNIQUE:  RT Broderick on 4/3/2023  1:36 PM     PT STATUS: OP  PROCEDURE: CT LOW DOSE LUNG SCAN  CLINICAL HX : SMOKER 50 YRS/1 PK/DAY  PMH: COPD, HTN    CTDIvol(mGy): HEAD:     BODY: 1.6     DLP(mGycm): HEAD:     BODY: 60.8     This patient has had 1 CT and nuclear medicine scans performed within the last 12 months.     The following DOSE REDUCTION TECHNIQUES are used for all CT scans at [facility]:     1. Automated exposure control.  2. Adjustment of the mA and/or kv according to patient size.  3. Use of iterative reconstruction technique.     COMPARISON:  Chest x-ray dated 03/18/2021     FINDINGS:  Lungs: The lungs are slightly hyperexpanded with moderate emphysematous changes noted bilaterally and minimal bullous changes noted within both apices.  I see no worrisome parenchymal masses/nodules or focal consolidation.     Airways: No clinically significant abnormalities noted.     Mediastinum and hilar regions:No worrisome hilar mediastinal masses or matted lymphadenopathy are  appreciated.     Vascular structures: Fairly prominent atherosclerotic plaquing is noted throughout the thoracic aorta and its primary branches including the coronary arteries.     Musculoskeletal structures: Mild degenerative changes are noted throughout the thoracic spine.     Miscellaneous: Incidentally noted on cuts through the upper abdomen is a 6-7 mm, nonobstructing stone/nephrolithiasis within the mid pole calices of the right kidney.     Impression:     1. Chronic changes are present including findings compatible with COPD.  2. Incidentally noted on cuts through the upper abdomen is a 6-7 mm, nonobstructing stone/nephrolithiasis within the mid pole calices of the right kidney.  3. No worrisome parenchymal masses/nodules or focal consolidation are noted within either lung.        Electronically signed by: Abi Neely  Date:                                            04/03/2023  Time:                                           13:46     X-Ray Chest PA And Lateral  Order: [id number]  Details    Reading Physician Reading Date Result Priority   Rony Mckinnon MD  [phone] 3/18/2021      Narrative & Impression  STUDY: CHEST X-RAY 2 VIEWS            [id number] 4:58 AM 3/18/2021:     PMH: COPD, SMOKER      TECHNIQUE: 2 VIEWS OF CHEST        Comparison: 3/15/2021        Findings:        There is a left ventricular configuration to the cardiac silhouette with epicardial fat pads obscuring the cardiophrenic angles.  Atherosclerotic calcification and tortuosity involve the aorta.  The lungs are expanded and show previously seen moderate,   bilateral perihilar stranding extending peripherally with linear densities involving the basilar regions on both sides.  No significant pleural effusion is identified.  Mild bony demineralization is present along with mild degenerative findings   throughout the thoracic spine.        Impression:        1.    Expanded lungs with moderate, bilateral, perihilar stranding extending  peripherally with linear densities involving both lung bases, showing no significant interval change since the study of 3/15/2021.  The findings could be related to   subsegmental atelectasis superimposed on interstitial fibrosis and parenchymal scarring however, the possibility of atypical interstitial pneumonia cannot be excluded on the basis of this exam and clinical correlation is recommended.                 Specimen Collected: 03/18/21 00:00 Last Resulted: 03/18/21 00:00             Assessment/Plan:     1. Psychophysiological insomnia  Refill - zolpidem (AMBIEN) 10 mg Tab; Take 1 tablet (10 mg total) by mouth nightly.  Dispense: 30 tablet; Refill: 3    2. Chronic obstructive pulmonary disease with (acute) exacerbation  - Ambulatory referral/consult to Pulmonology; Future    3. Benign hypertension  Well controlled. Continue lisinopril 5mg po daily.   Low Sodium Diet (DASH Diet - Less than 2 grams of sodium per day).  Monitor blood pressure daily and log. Report consistent numbers greater than 140/90.  Maintain healthy weight with goal BMI <30. Exercise 30 minutes per day, 5 days per week.  Smoking cessation encouraged to aid in BP reduction.    Cardiologist: appt pending this week.  Rheumatology appt pending.     Follow up in about 3 months (around 11/29/2023) for Follow Up, ambien, no labs, referrals.Call sooner if needed.   Future Appointments   Date Time Provider Department Center   11/29/2023  3:30 PM Kalie Little NP [facility]

## 2023-08-29 ENCOUNTER — OFFICE VISIT (OUTPATIENT)
Dept: FAMILY MEDICINE | Facility: CLINIC | Age: 68
End: 2023-08-29
Payer: MEDICARE

## 2023-08-29 VITALS
BODY MASS INDEX: 24.17 KG/M2 | HEIGHT: 67 IN | DIASTOLIC BLOOD PRESSURE: 62 MMHG | OXYGEN SATURATION: 96 % | HEART RATE: 56 BPM | TEMPERATURE: 98 F | SYSTOLIC BLOOD PRESSURE: 122 MMHG | WEIGHT: 154 LBS

## 2023-08-29 DIAGNOSIS — F51.04 PSYCHOPHYSIOLOGICAL INSOMNIA: Primary | ICD-10-CM

## 2023-08-29 DIAGNOSIS — J44.1 CHRONIC OBSTRUCTIVE PULMONARY DISEASE WITH (ACUTE) EXACERBATION: ICD-10-CM

## 2023-08-29 DIAGNOSIS — I10 BENIGN HYPERTENSION: ICD-10-CM

## 2023-08-29 PROCEDURE — 99213 PR OFFICE/OUTPT VISIT, EST, LEVL III, 20-29 MIN: ICD-10-PCS | Mod: ,,, | Performed by: NURSE PRACTITIONER

## 2023-08-29 PROCEDURE — 99213 OFFICE O/P EST LOW 20 MIN: CPT | Mod: ,,, | Performed by: NURSE PRACTITIONER

## 2023-08-29 RX ORDER — ZOLPIDEM TARTRATE 10 MG/1
10 TABLET ORAL NIGHTLY
Qty: 30 TABLET | Refills: 3 | Status: SHIPPED | OUTPATIENT
Start: 2023-08-29 | End: 2023-12-18 | Stop reason: SDUPTHER

## 2023-09-12 ENCOUNTER — OFFICE VISIT (OUTPATIENT)
Dept: FAMILY MEDICINE | Facility: CLINIC | Age: 68
End: 2023-09-12
Payer: MEDICARE

## 2023-09-12 VITALS
DIASTOLIC BLOOD PRESSURE: 82 MMHG | BODY MASS INDEX: 24.17 KG/M2 | HEIGHT: 67 IN | HEART RATE: 90 BPM | TEMPERATURE: 98 F | SYSTOLIC BLOOD PRESSURE: 158 MMHG | WEIGHT: 154 LBS | OXYGEN SATURATION: 91 %

## 2023-09-12 DIAGNOSIS — J40 BRONCHITIS: Primary | ICD-10-CM

## 2023-09-12 DIAGNOSIS — L84 CALLUS OF FOOT: ICD-10-CM

## 2023-09-12 PROCEDURE — 99213 OFFICE O/P EST LOW 20 MIN: CPT | Mod: 25,,, | Performed by: NURSE PRACTITIONER

## 2023-09-12 PROCEDURE — 96372 THER/PROPH/DIAG INJ SC/IM: CPT | Mod: ,,, | Performed by: NURSE PRACTITIONER

## 2023-09-12 PROCEDURE — 96372 PR INJECTION,THERAP/PROPH/DIAG2ST, IM OR SUBCUT: ICD-10-PCS | Mod: ,,, | Performed by: NURSE PRACTITIONER

## 2023-09-12 PROCEDURE — 99213 PR OFFICE/OUTPT VISIT, EST, LEVL III, 20-29 MIN: ICD-10-PCS | Mod: 25,,, | Performed by: NURSE PRACTITIONER

## 2023-09-12 PROCEDURE — 94640 AIRWAY INHALATION TREATMENT: CPT | Mod: ,,, | Performed by: NURSE PRACTITIONER

## 2023-09-12 PROCEDURE — 94640 PR INHAL RX, AIRWAY OBST/DX SPUTUM INDUCT: ICD-10-PCS | Mod: ,,, | Performed by: NURSE PRACTITIONER

## 2023-09-12 RX ORDER — ALBUTEROL SULFATE 0.83 MG/ML
2.5 SOLUTION RESPIRATORY (INHALATION)
Status: COMPLETED | OUTPATIENT
Start: 2023-09-12 | End: 2023-09-12

## 2023-09-12 RX ORDER — CHLORPHENIRAMINE MALEATE AND DEXTROMETHORPHAN HYDROBROMIDE 4; 30 MG/1; MG/1
4-30 TABLET, FILM COATED ORAL EVERY 6 HOURS PRN
Qty: 20 EACH | Refills: 0 | Status: SHIPPED | OUTPATIENT
Start: 2023-09-12 | End: 2023-09-22

## 2023-09-12 RX ORDER — PREDNISONE 20 MG/1
40 TABLET ORAL DAILY
Qty: 10 TABLET | Refills: 0 | Status: SHIPPED | OUTPATIENT
Start: 2023-09-12 | End: 2023-09-17

## 2023-09-12 RX ORDER — DEXAMETHASONE SODIUM PHOSPHATE 4 MG/ML
8 INJECTION, SOLUTION INTRA-ARTICULAR; INTRALESIONAL; INTRAMUSCULAR; INTRAVENOUS; SOFT TISSUE
Status: COMPLETED | OUTPATIENT
Start: 2023-09-12 | End: 2023-09-12

## 2023-09-12 RX ADMIN — ALBUTEROL SULFATE 2.5 MG: 0.83 SOLUTION RESPIRATORY (INHALATION) at 03:09

## 2023-09-12 RX ADMIN — DEXAMETHASONE SODIUM PHOSPHATE 8 MG: 4 INJECTION, SOLUTION INTRA-ARTICULAR; INTRALESIONAL; INTRAMUSCULAR; INTRAVENOUS; SOFT TISSUE at 03:09

## 2023-09-12 NOTE — PROGRESS NOTES
Subjective:       Patient ID: Brandie Lobato is a 68 y.o. female.    Chief Complaint: Sinusitis and Nasal Congestion (X few days )    HPI  Patient presents to the clinic with c/o stuffy nose, sore throat, cough, sinus congestion, bilateral leg cramps, chills, night sweats for the past week. Denies fever, sick contact, shortness of breath or difficulty breathing. Patient reports she was seen at urgent care recently for the same s/s and c/o, negative covid and flu swab, rx Mucinex, Flonase, promethazine cough syrup and z-dilan. Last dose of azithromycin yesterday.   Patient also notes painful calluses to right foot for the past few years which are getting worse and request referral to podiatry.   Past Medical History:   Diagnosis Date    Fibrocystic breast     Heart murmur     Hypercholesteremia     Hypertension     Impaired fasting blood sugar     Insomnia     Mitral regurgitation     Vitamin D deficiency      Social History     Tobacco Use    Smoking status: Every Day     Current packs/day: 1.00     Types: Cigarettes    Smokeless tobacco: Never   Substance Use Topics    Alcohol use: Not Currently    Drug use: Never     Past Surgical History:   Procedure Laterality Date    ADENOIDECTOMY      CHOLECYSTECTOMY      HYSTERECTOMY       History reviewed. No pertinent family history.    Review of Systems -see HPI  Objective:     Physical Exam  Vitals and nursing note reviewed.   Constitutional:       General: She is not in acute distress.     Appearance: Normal appearance. She is normal weight. She is not toxic-appearing.   HENT:      Head: Normocephalic and atraumatic.      Nose: Congestion and rhinorrhea present.      Mouth/Throat:      Mouth: Mucous membranes are moist.      Pharynx: Oropharynx is clear. No posterior oropharyngeal erythema.   Eyes:      Extraocular Movements: Extraocular movements intact.      Conjunctiva/sclera: Conjunctivae normal.      Pupils: Pupils are equal, round, and reactive to light.    Cardiovascular:      Rate and Rhythm: Normal rate and regular rhythm.      Pulses:           Dorsalis pedis pulses are 2+ on the right side.      Heart sounds: Normal heart sounds.   Pulmonary:      Effort: No respiratory distress.      Breath sounds: Wheezing present.   Musculoskeletal:         General: Normal range of motion.      Cervical back: Normal range of motion.   Feet:      Right foot:      Skin integrity: Callus (multiple) present. No skin breakdown, erythema or warmth.   Lymphadenopathy:      Cervical: No cervical adenopathy.   Skin:     General: Skin is warm and dry.      Coloration: Skin is not jaundiced or pale.      Findings: No rash.   Neurological:      General: No focal deficit present.      Mental Status: She is alert and oriented to person, place, and time. Mental status is at baseline.   Psychiatric:         Mood and Affect: Mood normal.         Behavior: Behavior normal.         Thought Content: Thought content normal.         Judgment: Judgment normal.       Vitals:    09/12/23 1445   BP: (!) 158/82   Pulse: 90   Temp: 97.9 °F (36.6 °C)     Assessment/Plan:   1. Bronchitis  - dexAMETHasone injection 8 mg IM x1 in clinic.   - duoneb nebulizer solution x1 in clinic.   Rx - chlorpheniramine-dextromethorp (CORICIDIN HBP COUGH AND COLD) 4-30 mg Tab; Take 4-30 mg by mouth every 6 (six) hours as needed (c/c).  Dispense: 20 each; Refill: 0  Rx - predniSONE (DELTASONE) 20 MG tablet; Take 2 tablets (40 mg total) by mouth once daily. for 5 days  Dispense: 10 tablet; Refill: 0. Patient instructed to start prednisone tomorrow am.     2. Calluses of right foot  - Ambulatory referral/consult to Podiatry; Future

## 2023-09-20 ENCOUNTER — PATIENT MESSAGE (OUTPATIENT)
Dept: ADMINISTRATIVE | Facility: HOSPITAL | Age: 68
End: 2023-09-20
Payer: MEDICARE

## 2023-10-23 ENCOUNTER — CLINICAL SUPPORT (OUTPATIENT)
Dept: FAMILY MEDICINE | Facility: CLINIC | Age: 68
End: 2023-10-23
Payer: MEDICARE

## 2023-10-23 DIAGNOSIS — Z23 IMMUNIZATION DUE: Primary | ICD-10-CM

## 2023-10-23 PROCEDURE — 90694 VACC AIIV4 NO PRSRV 0.5ML IM: CPT | Mod: ,,, | Performed by: NURSE PRACTITIONER

## 2023-10-23 PROCEDURE — G0008 ADMIN INFLUENZA VIRUS VAC: HCPCS | Mod: ,,, | Performed by: NURSE PRACTITIONER

## 2023-10-23 PROCEDURE — 90694 FLU VACCINE - QUADRIVALENT - ADJUVANTED: ICD-10-PCS | Mod: ,,, | Performed by: NURSE PRACTITIONER

## 2023-10-23 PROCEDURE — G0008 FLU VACCINE - QUADRIVALENT - ADJUVANTED: ICD-10-PCS | Mod: ,,, | Performed by: NURSE PRACTITIONER

## 2023-12-11 ENCOUNTER — TELEPHONE (OUTPATIENT)
Dept: FAMILY MEDICINE | Facility: CLINIC | Age: 68
End: 2023-12-11
Payer: MEDICARE

## 2023-12-11 NOTE — TELEPHONE ENCOUNTER
I spoke with pharmacy and they were questioning about a PA. I informed them I did not have one on this pt. They are sending a new one.

## 2023-12-12 NOTE — PROGRESS NOTES
"Patient ID: Brandie Lobato  : 1955    Chief Complaint: Follow-up (3 mth f/u. Pt is here to f/u on the ambien. Pt is doing well with it. //Pt stated her ears have been bothering her with the tubs and ear infections//Pt has been doing well with Dr Pond. Already received her second injection from him)    Allergies: Patient has No Known Allergies.     History of Present Illness:  The patient is a 68 y.o. White female who presents to clinic for follow up on Follow-up (3 mth f/u. Pt is here to f/u on the ambien. Pt is doing well with it. //Pt stated her ears have been bothering her with the tubs and ear infections//Pt has been doing well with Dr Pond. Already received her second injection from him)       Social History:  reports that she has been smoking cigarettes. She has never used smokeless tobacco. She reports that she does not currently use alcohol. She reports that she does not use drugs.    Past Medical History:  has a past medical history of Fibrocystic breast, Heart murmur, Hypercholesteremia, Hypertension, Impaired fasting blood sugar, Insomnia, Mitral regurgitation, and Vitamin D deficiency.    Current Medications:  Current Outpatient Medications   Medication Instructions    cholecalciferol, vitamin D3, 1,250 mcg (50,000 unit) capsule 50,000 Int'l Units, Oral, Weekly    famotidine (PEPCID) 20 mg, Oral, Nightly PRN    gabapentin (NEURONTIN) 300 MG capsule Oral    lisinopriL (PRINIVIL,ZESTRIL) 5 mg, Oral, Daily    meloxicam (MOBIC) 7.5 mg, Oral, Daily PRN    ofloxacin (FLOXIN) 0.3 % otic solution 5 drops, Left Ear, Daily    rosuvastatin (CRESTOR) 10 mg, Oral, Daily    zolpidem (AMBIEN) 10 mg, Oral, Nightly       Review of Systems -see HPI    Visit Vitals  /70   Pulse 81   Temp 98.1 °F (36.7 °C)   Ht 5' 7" (1.702 m)   Wt 69.9 kg (154 lb)   SpO2 96%   BMI 24.12 kg/m²       Physical Exam  Vitals and nursing note reviewed.   Constitutional:       General: She is not in acute distress.     " Appearance: Normal appearance. She is normal weight. She is not toxic-appearing.   HENT:      Head: Normocephalic and atraumatic.      Right Ear: Tympanic membrane, ear canal and external ear normal.      Left Ear: Tympanic membrane, ear canal and external ear normal.      Ears:      Comments: Right ear w/o acute findings. + blue PE tube in place  Left ear w/o acute findings, no PE tube noted.      Nose: Nose normal.      Mouth/Throat:      Mouth: Mucous membranes are moist.      Pharynx: Oropharynx is clear.   Eyes:      Extraocular Movements: Extraocular movements intact.      Conjunctiva/sclera: Conjunctivae normal.      Pupils: Pupils are equal, round, and reactive to light.   Cardiovascular:      Rate and Rhythm: Normal rate and regular rhythm.      Heart sounds: Normal heart sounds. No murmur heard.     No friction rub. No gallop.   Pulmonary:      Effort: Pulmonary effort is normal.      Breath sounds: Normal breath sounds.   Musculoskeletal:         General: Normal range of motion.      Cervical back: Normal range of motion and neck supple.   Skin:     General: Skin is warm and dry.      Coloration: Skin is not jaundiced or pale.      Findings: No rash.   Neurological:      General: No focal deficit present.      Mental Status: She is alert and oriented to person, place, and time. Mental status is at baseline.   Psychiatric:         Mood and Affect: Mood normal.         Behavior: Behavior normal.         Thought Content: Thought content normal.         Judgment: Judgment normal.          Labs Reviewed:  Chemistry:  Lab Results   Component Value Date     07/12/2023    K 4.2 07/12/2023    CHLORIDE 106 07/12/2023    BUN 23.0 (H) 07/12/2023    CREATININE 0.89 07/12/2023    EGFRNORACEVR 70 07/12/2023    GLUCOSE 134 (H) 07/12/2023    CALCIUM 9.6 07/12/2023    ALKPHOS 71 05/16/2023    LABPROT 6.8 05/16/2023    ALBUMIN 4.3 05/16/2023    AST 22 05/16/2023    ALT 17 05/16/2023      Hematology:  Lab Results    Component Value Date    WBC 7.29 05/16/2023    RBC 4.84 05/16/2023    HGB 14.9 05/16/2023    HCT 45.0 05/16/2023    MCV 93.0 05/16/2023    MCH 30.8 05/16/2023    MCHC 33.1 05/16/2023    RDW 13.3 05/16/2023     05/16/2023    MPV 12.4 05/16/2023       Lipid Panel:  Lab Results   Component Value Date    CHOL 115 05/16/2023    HDL 44 05/16/2023    DLDL 54.7 05/16/2023    TRIG 84 05/16/2023        Assessment & Plan:  1. Psychophysiological insomnia  -  refill  zolpidem (AMBIEN) 10 mg Tab; Take 1 tablet (10 mg total) by mouth nightly.  Dispense: 30 tablet; Refill: 3    2. Ear pain, left.  Patient instructed to f/u with established ENT. Return to the clinic as needed.       Follow up in about 3 months (around 3/18/2024) for Combat Strokeal. Call sooner if needed.    Lab Frequency Next Occurrence   Ambulatory referral/consult to Urology Once 06/06/2023   Ambulatory referral/consult to Cardiology Once 06/09/2023   Ambulatory referral/consult to Rheumatology Once 06/09/2023   Ambulatory referral/consult to Neurosurgery Once 06/15/2023   Ambulatory referral/consult to Gynecology Once 07/12/2023   Ambulatory referral/consult to Pulmonology Once 09/11/2023   Ambulatory referral/consult to Podiatry Once 09/19/2023        I spent a total of 15 minutes on the day of the visit.  This includes face to face time and non-face to face time preparing to see the patient (eg, review of tests), obtaining and/or reviewing separately obtained history, documenting clinical information in the electronic or other health record, independently interpreting results and communicating results to the patient/family/caregiver, or care coordinator.

## 2023-12-18 ENCOUNTER — OFFICE VISIT (OUTPATIENT)
Dept: FAMILY MEDICINE | Facility: CLINIC | Age: 68
End: 2023-12-18
Payer: MEDICARE

## 2023-12-18 VITALS
OXYGEN SATURATION: 96 % | HEIGHT: 67 IN | WEIGHT: 154 LBS | DIASTOLIC BLOOD PRESSURE: 70 MMHG | BODY MASS INDEX: 24.17 KG/M2 | SYSTOLIC BLOOD PRESSURE: 132 MMHG | TEMPERATURE: 98 F | HEART RATE: 81 BPM

## 2023-12-18 DIAGNOSIS — H92.02 EAR PAIN, LEFT: ICD-10-CM

## 2023-12-18 DIAGNOSIS — F51.04 PSYCHOPHYSIOLOGICAL INSOMNIA: Primary | ICD-10-CM

## 2023-12-18 PROCEDURE — 99212 OFFICE O/P EST SF 10 MIN: CPT | Mod: ,,, | Performed by: NURSE PRACTITIONER

## 2023-12-18 RX ORDER — ZOLPIDEM TARTRATE 10 MG/1
10 TABLET ORAL NIGHTLY
Qty: 30 TABLET | Refills: 3 | Status: SHIPPED | OUTPATIENT
Start: 2023-12-18 | End: 2024-05-09

## 2023-12-18 RX ORDER — MELOXICAM 7.5 MG/1
7.5 TABLET ORAL DAILY PRN
COMMUNITY
Start: 2023-12-08

## 2024-01-04 ENCOUNTER — CLINICAL SUPPORT (OUTPATIENT)
Dept: FAMILY MEDICINE | Facility: CLINIC | Age: 69
End: 2024-01-04
Payer: MEDICARE

## 2024-01-04 DIAGNOSIS — M25.552 LEFT HIP PAIN: Primary | ICD-10-CM

## 2024-01-04 PROCEDURE — 96372 THER/PROPH/DIAG INJ SC/IM: CPT | Mod: ,,, | Performed by: NURSE PRACTITIONER

## 2024-01-04 RX ORDER — DEXAMETHASONE SODIUM PHOSPHATE 4 MG/ML
8 INJECTION, SOLUTION INTRA-ARTICULAR; INTRALESIONAL; INTRAMUSCULAR; INTRAVENOUS; SOFT TISSUE
Status: COMPLETED | OUTPATIENT
Start: 2024-01-04 | End: 2024-01-04

## 2024-01-04 RX ORDER — KETOROLAC TROMETHAMINE 30 MG/ML
30 INJECTION, SOLUTION INTRAMUSCULAR; INTRAVENOUS ONCE
Status: COMPLETED | OUTPATIENT
Start: 2024-01-04 | End: 2024-01-04

## 2024-01-04 RX ADMIN — KETOROLAC TROMETHAMINE 30 MG: 30 INJECTION, SOLUTION INTRAMUSCULAR; INTRAVENOUS at 11:01

## 2024-01-04 RX ADMIN — DEXAMETHASONE SODIUM PHOSPHATE 8 MG: 4 INJECTION, SOLUTION INTRA-ARTICULAR; INTRALESIONAL; INTRAMUSCULAR; INTRAVENOUS; SOFT TISSUE at 11:01

## 2024-01-04 NOTE — PROGRESS NOTES
Patient was here in clinic today with complaints of back pain on the left side that is going into her buttocks. Patient went to Urgent care and was told it is not a kidney infection nor a kidney stone. Patient was wanting a steroid shot today along with a Toradol. Patient was given 8mg of dexamethasone in left deltoid and 30mg of ketorolac in right deltoid. Patient tolerated both injections very well. I will personally check up on patient tomorrow to see how she is feeling.

## 2024-02-06 ENCOUNTER — OFFICE VISIT (OUTPATIENT)
Dept: FAMILY MEDICINE | Facility: CLINIC | Age: 69
End: 2024-02-06
Payer: MEDICARE

## 2024-02-06 VITALS
TEMPERATURE: 97 F | BODY MASS INDEX: 24.17 KG/M2 | HEIGHT: 67 IN | SYSTOLIC BLOOD PRESSURE: 124 MMHG | OXYGEN SATURATION: 94 % | HEART RATE: 101 BPM | DIASTOLIC BLOOD PRESSURE: 74 MMHG | WEIGHT: 154 LBS

## 2024-02-06 DIAGNOSIS — J02.0 STREP SORE THROAT: Primary | ICD-10-CM

## 2024-02-06 DIAGNOSIS — H92.02 EAR PAIN, LEFT: ICD-10-CM

## 2024-02-06 PROCEDURE — 99212 OFFICE O/P EST SF 10 MIN: CPT | Mod: 25,,, | Performed by: NURSE PRACTITIONER

## 2024-02-06 PROCEDURE — 96372 THER/PROPH/DIAG INJ SC/IM: CPT | Mod: ,,, | Performed by: NURSE PRACTITIONER

## 2024-02-06 RX ORDER — DEXAMETHASONE SODIUM PHOSPHATE 4 MG/ML
4 INJECTION, SOLUTION INTRA-ARTICULAR; INTRALESIONAL; INTRAMUSCULAR; INTRAVENOUS; SOFT TISSUE
Status: DISCONTINUED | OUTPATIENT
Start: 2024-02-06 | End: 2024-02-06

## 2024-02-06 RX ORDER — METHOCARBAMOL 500 MG/1
500 TABLET, FILM COATED ORAL EVERY 8 HOURS
COMMUNITY
Start: 2024-01-02

## 2024-02-06 RX ORDER — PENICILLIN V POTASSIUM 500 MG/1
500 TABLET, FILM COATED ORAL EVERY 12 HOURS
COMMUNITY
Start: 2024-02-05 | End: 2024-03-19

## 2024-02-06 RX ORDER — DEXAMETHASONE SODIUM PHOSPHATE 4 MG/ML
8 INJECTION, SOLUTION INTRA-ARTICULAR; INTRALESIONAL; INTRAMUSCULAR; INTRAVENOUS; SOFT TISSUE
Status: COMPLETED | OUTPATIENT
Start: 2024-02-06 | End: 2024-02-06

## 2024-02-06 RX ADMIN — DEXAMETHASONE SODIUM PHOSPHATE 8 MG: 4 INJECTION, SOLUTION INTRA-ARTICULAR; INTRALESIONAL; INTRAMUSCULAR; INTRAVENOUS; SOFT TISSUE at 10:02

## 2024-02-06 NOTE — PROGRESS NOTES
"Patient ID: Brandie Lobato  : 1955    Chief Complaint: Sore Throat    Allergies: Patient has No Known Allergies.     History of Present Illness:  The patient is a 68 y.o. White female who presents to clinic for  Sore Throat. Presents to clinic requesting a steroid shot. She was diagnosed with strep yesterday at urgent care and was given penicillin 500mg q12 hours. + pain with swallowing. Denies shortness of breath, drooling, difficulty swallowing. Has been taking abx as rx.     Social History:  reports that she has been smoking cigarettes. She has never used smokeless tobacco. She reports that she does not currently use alcohol. She reports that she does not use drugs.    Past Medical History:  has a past medical history of Fibrocystic breast, Heart murmur, Hypercholesteremia, Hypertension, Impaired fasting blood sugar, Insomnia, Mitral regurgitation, and Vitamin D deficiency.    Surgical History:   Past Surgical History:   Procedure Laterality Date    ADENOIDECTOMY      CHOLECYSTECTOMY      HYSTERECTOMY         Current Medications:  Current Outpatient Medications   Medication Instructions    cholecalciferol, vitamin D3, 1,250 mcg (50,000 unit) capsule 50,000 Int'l Units, Oral, Weekly    clindamycin (CLEOCIN) 300 mg, Oral, 3 times daily    famotidine (PEPCID) 20 mg, Oral, Nightly PRN    fluconazole (DIFLUCAN) 150 MG Tab 1 tab now and one tab in 3 days.  Disp #2    gabapentin (NEURONTIN) 300 MG capsule Oral    lisinopriL (PRINIVIL,ZESTRIL) 5 mg, Oral, Daily    meloxicam (MOBIC) 7.5 mg, Oral, Daily PRN    methocarbamoL (ROBAXIN) 500 mg, Oral, Every 8 hours    ofloxacin (FLOXIN) 0.3 % otic solution 5 drops, Left Ear, Daily    penicillin v potassium (VEETID) 500 mg, Oral, Every 12 hours    rosuvastatin (CRESTOR) 10 mg, Oral, Daily    zolpidem (AMBIEN) 10 mg, Oral, Nightly       Review of Systems see HPI    Visit Vitals  /74   Pulse 101   Temp 97.4 °F (36.3 °C)   Ht 5' 7" (1.702 m)   Wt 69.9 kg (154 lb) "   SpO2 (!) 94%   BMI 24.12 kg/m²       Physical Exam  Vitals and nursing note reviewed.   Constitutional:       General: She is not in acute distress.     Appearance: Normal appearance. She is normal weight. She is not toxic-appearing.   HENT:      Head: Normocephalic and atraumatic.      Right Ear: Tympanic membrane, ear canal and external ear normal.      Left Ear: Tympanic membrane, ear canal and external ear normal.      Nose: Nose normal.      Mouth/Throat:      Mouth: Mucous membranes are moist.      Pharynx: Oropharyngeal exudate and posterior oropharyngeal erythema present.   Eyes:      Extraocular Movements: Extraocular movements intact.      Conjunctiva/sclera: Conjunctivae normal.      Pupils: Pupils are equal, round, and reactive to light.   Cardiovascular:      Rate and Rhythm: Normal rate and regular rhythm.      Heart sounds: Normal heart sounds. No murmur heard.     No friction rub. No gallop.   Pulmonary:      Effort: Pulmonary effort is normal.      Breath sounds: Normal breath sounds.   Musculoskeletal:         General: Normal range of motion.      Cervical back: Normal range of motion and neck supple.   Skin:     General: Skin is warm and dry.      Coloration: Skin is not jaundiced or pale.      Findings: No rash.   Neurological:      General: No focal deficit present.      Mental Status: She is alert and oriented to person, place, and time. Mental status is at baseline.   Psychiatric:         Mood and Affect: Mood normal.         Behavior: Behavior normal.         Thought Content: Thought content normal.         Judgment: Judgment normal.          Labs Reviewed:  Chemistry:  Lab Results   Component Value Date     07/12/2023    K 4.2 07/12/2023    CHLORIDE 106 07/12/2023    BUN 23.0 (H) 07/12/2023    CREATININE 0.89 07/12/2023    EGFRNORACEVR 70 07/12/2023    GLUCOSE 134 (H) 07/12/2023    CALCIUM 9.6 07/12/2023    ALKPHOS 71 05/16/2023    LABPROT 6.8 05/16/2023    ALBUMIN 4.3 05/16/2023     AST 22 05/16/2023    ALT 17 05/16/2023      Hematology:  Lab Results   Component Value Date    WBC 7.29 05/16/2023    RBC 4.84 05/16/2023    HGB 14.9 05/16/2023    HCT 45.0 05/16/2023    MCV 93.0 05/16/2023    MCH 30.8 05/16/2023    MCHC 33.1 05/16/2023    RDW 13.3 05/16/2023     05/16/2023    MPV 12.4 05/16/2023       Lipid Panel:  Lab Results   Component Value Date    CHOL 115 05/16/2023    HDL 44 05/16/2023    DLDL 54.7 05/16/2023    TRIG 84 05/16/2023        Assessment & Plan:  1. Strep sore throat  -     Discontinue: dexAMETHasone injection 4 mg  -     dexAMETHasone injection 8 mg    2. Ear pain, left  -     Ambulatory referral/consult to ENT; Future; Expected date: 02/13/2024     Complete abx as rx.   Return to the clinic as needed.    Future Appointments   Date Time Provider Department Center   3/18/2024  1:00 PM Kalie Little NP [facility]     Lab Frequency Next Occurrence   Ambulatory referral/consult to Urology Once 06/06/2023   Ambulatory referral/consult to Cardiology Once 06/09/2023   Ambulatory referral/consult to Rheumatology Once 06/09/2023   Ambulatory referral/consult to Neurosurgery Once 06/15/2023   Ambulatory referral/consult to Gynecology Once 07/12/2023   Ambulatory referral/consult to Pulmonology Once 09/11/2023   Ambulatory referral/consult to Podiatry Once 09/19/2023        I spent a total of 15 minutes on the day of the visit.  This includes face to face time and non-face to face time preparing to see the patient (eg, review of tests), obtaining and/or reviewing separately obtained history, documenting clinical information in the electronic or other health record, independently interpreting results and communicating results to the patient/family/caregiver, or care coordinator.

## 2024-02-12 ENCOUNTER — OFFICE VISIT (OUTPATIENT)
Dept: FAMILY MEDICINE | Facility: CLINIC | Age: 69
End: 2024-02-12
Payer: MEDICARE

## 2024-02-12 VITALS
SYSTOLIC BLOOD PRESSURE: 128 MMHG | DIASTOLIC BLOOD PRESSURE: 68 MMHG | BODY MASS INDEX: 23.54 KG/M2 | TEMPERATURE: 98 F | HEART RATE: 97 BPM | OXYGEN SATURATION: 99 % | WEIGHT: 150 LBS | HEIGHT: 67 IN

## 2024-02-12 DIAGNOSIS — B37.31 CANDIDIASIS OF GENITALIA IN FEMALE: ICD-10-CM

## 2024-02-12 DIAGNOSIS — J02.0 STREP PHARYNGITIS: Primary | ICD-10-CM

## 2024-02-12 PROCEDURE — 99212 OFFICE O/P EST SF 10 MIN: CPT | Mod: 25,,, | Performed by: NURSE PRACTITIONER

## 2024-02-12 PROCEDURE — 96372 THER/PROPH/DIAG INJ SC/IM: CPT | Mod: ,,, | Performed by: NURSE PRACTITIONER

## 2024-02-12 RX ORDER — DEXAMETHASONE SODIUM PHOSPHATE 4 MG/ML
4 INJECTION, SOLUTION INTRA-ARTICULAR; INTRALESIONAL; INTRAMUSCULAR; INTRAVENOUS; SOFT TISSUE ONCE
Status: COMPLETED | OUTPATIENT
Start: 2024-02-12 | End: 2024-02-12

## 2024-02-12 RX ORDER — CLINDAMYCIN HYDROCHLORIDE 300 MG/1
300 CAPSULE ORAL 3 TIMES DAILY
Qty: 21 CAPSULE | Refills: 0 | Status: SHIPPED | OUTPATIENT
Start: 2024-02-12 | End: 2024-02-19

## 2024-02-12 RX ADMIN — DEXAMETHASONE SODIUM PHOSPHATE 4 MG: 4 INJECTION, SOLUTION INTRA-ARTICULAR; INTRALESIONAL; INTRAMUSCULAR; INTRAVENOUS; SOFT TISSUE at 07:02

## 2024-02-12 NOTE — PROGRESS NOTES
"Patient ID: Brandie Lobato  : 1955    Chief Complaint: Vaginitis and Sore Throat    Allergies: Patient has No Known Allergies.     History of Present Illness:  The patient is a 68 y.o. White female who presents to clinic for follow up on Vaginitis and Sore Throat . Dx a week ago with strep pharyngitis, has been taking amoxicillin as rx w/o relief. Denies fever, chills, difficulty swallowing, shortness of breath.     Social History:  reports that she has been smoking cigarettes. She has never used smokeless tobacco. She reports that she does not currently use alcohol. She reports that she does not use drugs.    Past Medical History:  has a past medical history of Fibrocystic breast, Heart murmur, Hypercholesteremia, Hypertension, Impaired fasting blood sugar, Insomnia, Mitral regurgitation, and Vitamin D deficiency.    Surgical History:   Past Surgical History:   Procedure Laterality Date    ADENOIDECTOMY      CHOLECYSTECTOMY      HYSTERECTOMY         Current Medications:  Current Outpatient Medications   Medication Instructions    cholecalciferol, vitamin D3, 1,250 mcg (50,000 unit) capsule 50,000 Int'l Units, Oral, Weekly    famotidine (PEPCID) 20 mg, Oral, Nightly PRN    fluconazole (DIFLUCAN) 150 MG Tab 1 tab now and one tab in 3 days.  Disp #2    gabapentin (NEURONTIN) 300 MG capsule Oral    lisinopriL (PRINIVIL,ZESTRIL) 5 mg, Oral, Daily    meloxicam (MOBIC) 7.5 mg, Oral, Daily PRN    methocarbamoL (ROBAXIN) 500 mg, Oral, Every 8 hours    ofloxacin (FLOXIN) 0.3 % otic solution 5 drops, Left Ear, Daily    penicillin v potassium (VEETID) 500 mg, Oral, Every 12 hours    rosuvastatin (CRESTOR) 10 mg, Oral, Daily    zolpidem (AMBIEN) 10 mg, Oral, Nightly       Review of Systems see HPI    Visit Vitals  /68   Pulse 97   Temp 97.9 °F (36.6 °C)   Ht 5' 7" (1.702 m)   Wt 68 kg (150 lb)   SpO2 99%   BMI 23.49 kg/m²       Physical Exam  Vitals and nursing note reviewed.   Constitutional:       General: She " is not in acute distress.     Appearance: Normal appearance. She is normal weight. She is not toxic-appearing.   HENT:      Head: Normocephalic and atraumatic.      Right Ear: Tympanic membrane, ear canal and external ear normal.      Left Ear: Tympanic membrane, ear canal and external ear normal.      Nose: Nose normal.      Mouth/Throat:      Mouth: Mucous membranes are moist.      Pharynx: Posterior oropharyngeal erythema present. No pharyngeal swelling, oropharyngeal exudate or uvula swelling.      Tonsils: No tonsillar exudate or tonsillar abscesses.   Eyes:      Extraocular Movements: Extraocular movements intact.      Conjunctiva/sclera: Conjunctivae normal.      Pupils: Pupils are equal, round, and reactive to light.   Cardiovascular:      Rate and Rhythm: Normal rate and regular rhythm.      Heart sounds: Normal heart sounds. No murmur heard.     No friction rub. No gallop.   Pulmonary:      Effort: Pulmonary effort is normal.      Breath sounds: Normal breath sounds.   Musculoskeletal:         General: Normal range of motion.      Cervical back: Normal range of motion and neck supple.   Skin:     General: Skin is warm and dry.      Coloration: Skin is not jaundiced or pale.      Findings: No rash.   Neurological:      General: No focal deficit present.      Mental Status: She is alert and oriented to person, place, and time. Mental status is at baseline.   Psychiatric:         Mood and Affect: Mood normal.         Behavior: Behavior normal.         Thought Content: Thought content normal.         Judgment: Judgment normal.          Labs Reviewed:  Chemistry:  Lab Results   Component Value Date     07/12/2023    K 4.2 07/12/2023    CHLORIDE 106 07/12/2023    BUN 23.0 (H) 07/12/2023    CREATININE 0.89 07/12/2023    EGFRNORACEVR 70 07/12/2023    GLUCOSE 134 (H) 07/12/2023    CALCIUM 9.6 07/12/2023    ALKPHOS 71 05/16/2023    LABPROT 6.8 05/16/2023    ALBUMIN 4.3 05/16/2023    AST 22 05/16/2023    ALT 17  05/16/2023      Hematology:  Lab Results   Component Value Date    WBC 7.29 05/16/2023    RBC 4.84 05/16/2023    HGB 14.9 05/16/2023    HCT 45.0 05/16/2023    MCV 93.0 05/16/2023    MCH 30.8 05/16/2023    MCHC 33.1 05/16/2023    RDW 13.3 05/16/2023     05/16/2023    MPV 12.4 05/16/2023       Lipid Panel:  Lab Results   Component Value Date    CHOL 115 05/16/2023    HDL 44 05/16/2023    DLDL 54.7 05/16/2023    TRIG 84 05/16/2023        Assessment & Plan:  1. Strep pharyngitis  -     dexAMETHasone injection 4 mg  -     clindamycin (CLEOCIN) 300 MG capsule; Take 1 capsule (300 mg total) by mouth 3 (three) times daily. for 7 days  Dispense: 21 capsule; Refill: 0    2. Candidiasis of genitalia in female  -     fluconazole (DIFLUCAN) 150 MG Tab; 1 tab now and one tab in 3 days.  Disp #2  Dispense: 2 tablet; Refill: 0         Follow up if symptoms worsen or fail to improve.   Return to the clinic as needed.    Future Appointments   Date Time Provider Department Center   3/18/2024  1:00 PM Kalie Little NP [facility]       Lab Frequency Next Occurrence   Ambulatory referral/consult to Urology Once 06/06/2023   Ambulatory referral/consult to Cardiology Once 06/09/2023   Ambulatory referral/consult to Rheumatology Once 06/09/2023   Ambulatory referral/consult to Neurosurgery Once 06/15/2023   Ambulatory referral/consult to Gynecology Once 07/12/2023   Ambulatory referral/consult to Pulmonology Once 09/11/2023   Ambulatory referral/consult to Podiatry Once 09/19/2023   Ambulatory referral/consult to ENT Once 02/13/2024          I spent a total of 14 minutes on the day of the visit.  This includes face to face time and non-face to face time preparing to see the patient (eg, review of tests), obtaining and/or reviewing separately obtained history, documenting clinical information in the electronic or other health record, independently interpreting results and communicating results to the  patient/family/caregiver, or care coordinator.

## 2024-02-14 ENCOUNTER — TELEPHONE (OUTPATIENT)
Dept: FAMILY MEDICINE | Facility: CLINIC | Age: 69
End: 2024-02-14
Payer: MEDICARE

## 2024-02-14 RX ORDER — FLUCONAZOLE 150 MG/1
TABLET ORAL
Qty: 2 TABLET | Refills: 0 | Status: SHIPPED | OUTPATIENT
Start: 2024-02-14 | End: 2024-05-23

## 2024-03-19 ENCOUNTER — OFFICE VISIT (OUTPATIENT)
Dept: FAMILY MEDICINE | Facility: CLINIC | Age: 69
End: 2024-03-19
Payer: MEDICARE

## 2024-03-19 VITALS
HEIGHT: 67 IN | TEMPERATURE: 97 F | DIASTOLIC BLOOD PRESSURE: 82 MMHG | OXYGEN SATURATION: 98 % | BODY MASS INDEX: 24.17 KG/M2 | HEART RATE: 91 BPM | WEIGHT: 154 LBS | SYSTOLIC BLOOD PRESSURE: 136 MMHG

## 2024-03-19 DIAGNOSIS — Z00.00 ANNUAL PHYSICAL EXAM: ICD-10-CM

## 2024-03-19 DIAGNOSIS — J30.2 SEASONAL ALLERGIES: ICD-10-CM

## 2024-03-19 DIAGNOSIS — F51.04 PSYCHOPHYSIOLOGICAL INSOMNIA: Primary | ICD-10-CM

## 2024-03-19 DIAGNOSIS — Z87.891 PERSONAL HISTORY OF NICOTINE DEPENDENCE: ICD-10-CM

## 2024-03-19 DIAGNOSIS — Z79.899 ENCOUNTER FOR LONG-TERM (CURRENT) USE OF MEDICATIONS: ICD-10-CM

## 2024-03-19 DIAGNOSIS — Z12.2 SCREENING FOR LUNG CANCER: ICD-10-CM

## 2024-03-19 PROCEDURE — 99213 OFFICE O/P EST LOW 20 MIN: CPT | Mod: ,,, | Performed by: NURSE PRACTITIONER

## 2024-03-19 RX ORDER — LEVOCETIRIZINE DIHYDROCHLORIDE 5 MG/1
5 TABLET, FILM COATED ORAL NIGHTLY
Qty: 30 TABLET | Refills: 11 | Status: SHIPPED | OUTPATIENT
Start: 2024-03-19 | End: 2025-03-19

## 2024-03-19 NOTE — PROGRESS NOTES
"Patient ID: Brandie Lobato  : 1955    Chief Complaint: Follow-up    Allergies: Patient has No Known Allergies.     History of Present Illness:  Patient presents to the clinic for 3 month follow up. She reports she was seen by Dr Pond and had her second injection which relieved her pain and states she is doing well.   Denies ear pain. + sinus drainage, known allergies - using Flonase.       Social History:  reports that she has been smoking cigarettes. She has never used smokeless tobacco. She reports that she does not currently use alcohol. She reports that she does not use drugs.    Past Medical History:  has a past medical history of Fibrocystic breast, Heart murmur, Hypercholesteremia, Hypertension, Impaired fasting blood sugar, Insomnia, Mitral regurgitation, and Vitamin D deficiency.    Surgical History:   Past Surgical History:   Procedure Laterality Date    ADENOIDECTOMY      CHOLECYSTECTOMY      HYSTERECTOMY         Current Medications:  Current Outpatient Medications   Medication Instructions    cholecalciferol, vitamin D3, 1,250 mcg (50,000 unit) capsule 50,000 Int'l Units, Oral, Weekly    famotidine (PEPCID) 20 mg, Oral, Nightly PRN    fluconazole (DIFLUCAN) 150 MG Tab 1 tab now and one tab in 3 days.  Disp #2    gabapentin (NEURONTIN) 300 MG capsule Oral    levocetirizine (XYZAL) 5 mg, Oral, Nightly    lisinopriL (PRINIVIL,ZESTRIL) 5 mg, Oral, Daily    meloxicam (MOBIC) 7.5 mg, Oral, Daily PRN    methocarbamoL (ROBAXIN) 500 mg, Oral, Every 8 hours    ofloxacin (FLOXIN) 0.3 % otic solution 5 drops, Left Ear, Daily    rosuvastatin (CRESTOR) 10 mg, Oral, Daily    zolpidem (AMBIEN) 10 mg, Oral, Nightly       Review of Systems see HPI    Visit Vitals  /82   Pulse 91   Temp 97.4 °F (36.3 °C)   Ht 5' 7" (1.702 m)   Wt 69.9 kg (154 lb)   SpO2 98%   BMI 24.12 kg/m²       Physical Exam  Vitals and nursing note reviewed.   Constitutional:       General: She is not in acute distress.     Appearance: " Normal appearance. She is normal weight. She is not toxic-appearing.   HENT:      Head: Normocephalic and atraumatic.      Ears:      Comments: Right ear blue PE tube in place w/o s/s of infection.   Left ear white PE tube in place w/o s/s of infection.      Nose: Nose normal.      Mouth/Throat:      Mouth: Mucous membranes are moist.      Pharynx: Oropharynx is clear.   Eyes:      Extraocular Movements: Extraocular movements intact.      Conjunctiva/sclera: Conjunctivae normal.      Pupils: Pupils are equal, round, and reactive to light.   Cardiovascular:      Rate and Rhythm: Normal rate and regular rhythm.      Heart sounds: Normal heart sounds. No murmur heard.     No friction rub. No gallop.   Pulmonary:      Effort: Pulmonary effort is normal.      Breath sounds: Normal breath sounds.   Musculoskeletal:         General: Normal range of motion.      Cervical back: Normal range of motion and neck supple.   Skin:     General: Skin is warm and dry.      Coloration: Skin is not jaundiced or pale.      Findings: No rash.   Neurological:      General: No focal deficit present.      Mental Status: She is alert and oriented to person, place, and time. Mental status is at baseline.   Psychiatric:         Mood and Affect: Mood normal.         Behavior: Behavior normal.         Thought Content: Thought content normal.         Judgment: Judgment normal.          Labs Reviewed:  Chemistry:  Lab Results   Component Value Date     07/12/2023    K 4.2 07/12/2023    CHLORIDE 106 07/12/2023    BUN 23.0 (H) 07/12/2023    CREATININE 0.89 07/12/2023    EGFRNORACEVR 70 07/12/2023    GLUCOSE 134 (H) 07/12/2023    CALCIUM 9.6 07/12/2023    ALKPHOS 71 05/16/2023    LABPROT 6.8 05/16/2023    ALBUMIN 4.3 05/16/2023    AST 22 05/16/2023    ALT 17 05/16/2023        Hematology:  Lab Results   Component Value Date    WBC 7.29 05/16/2023    RBC 4.84 05/16/2023    HGB 14.9 05/16/2023    HCT 45.0 05/16/2023    MCV 93.0 05/16/2023    MCH  30.8 05/16/2023    MCHC 33.1 05/16/2023    RDW 13.3 05/16/2023     05/16/2023    MPV 12.4 05/16/2023       Lipid Panel:  Lab Results   Component Value Date    CHOL 115 05/16/2023    HDL 44 05/16/2023    DLDL 54.7 05/16/2023    TRIG 84 05/16/2023        Assessment & Plan:  1. Psychophysiological insomnia    2. Seasonal allergies  -     levocetirizine (XYZAL) 5 MG tablet; Take 1 tablet (5 mg total) by mouth every evening.  Dispense: 30 tablet; Refill: 11    3. Personal history of nicotine dependence  -     CT Chest Lung Screening Low Dose; Future; Expected date: 04/19/2024    4. Screening for lung cancer  -     CT Chest Lung Screening Low Dose; Future; Expected date: 04/19/2024    5. Annual physical exam  -     Lipid Panel; Future; Expected date: 03/19/2024  -     Hemoglobin A1C; Future; Expected date: 03/19/2024  -     Vitamin D; Future; Expected date: 03/19/2024  -     TSH; Future; Expected date: 03/19/2024  -     Comprehensive Metabolic Panel; Future; Expected date: 03/19/2024  -     CBC Auto Differential; Future; Expected date: 03/19/2024    6. Encounter for long-term (current) use of medications  -     Lipid Panel; Future; Expected date: 03/19/2024  -     Hemoglobin A1C; Future; Expected date: 03/19/2024  -     Vitamin D; Future; Expected date: 03/19/2024  -     TSH; Future; Expected date: 03/19/2024  -     CBC Auto Differential; Future; Expected date: 03/19/2024      Follow up in about 8 weeks (around 5/14/2024) for Medicare Wellness, Fasting Labs Prior.     Return to the clinic as needed.    Future Appointments   Date Time Provider Department Center   5/14/2024  8:20 AM NURSE, [facility]   5/16/2024  3:30 PM Kalie Little NP [facility]     Lab Frequency Next Occurrence   Ambulatory referral/consult to Urology Once 06/06/2023   Ambulatory referral/consult to Cardiology Once 06/09/2023   Ambulatory referral/consult to Rheumatology Once 06/09/2023   Ambulatory  referral/consult to Neurosurgery Once 06/15/2023   Ambulatory referral/consult to Gynecology Once 07/12/2023   Ambulatory referral/consult to Pulmonology Once 09/11/2023   Ambulatory referral/consult to Podiatry Once 09/19/2023   Ambulatory referral/consult to ENT Once 02/13/2024          I spent a total of 20 minutes on the day of the visit.  This includes face to face time and non-face to face time preparing to see the patient (eg, review of tests), obtaining and/or reviewing separately obtained history, documenting clinical information in the electronic or other health record, independently interpreting results and communicating results to the patient/family/caregiver, or care coordinator.

## 2024-03-26 ENCOUNTER — HOSPITAL ENCOUNTER (OUTPATIENT)
Dept: RADIOLOGY | Facility: HOSPITAL | Age: 69
Discharge: HOME | End: 2024-03-26
Attending: INTERNAL MEDICINE
Payer: MEDICARE

## 2024-03-26 DIAGNOSIS — F17.200 TOBACCO USE DISORDER: ICD-10-CM

## 2024-03-26 PROCEDURE — 71271 CT THORAX LUNG CANCER SCR C-: CPT | Mod: TC

## 2024-05-09 DIAGNOSIS — F51.04 PSYCHOPHYSIOLOGICAL INSOMNIA: ICD-10-CM

## 2024-05-09 RX ORDER — ZOLPIDEM TARTRATE 10 MG/1
10 TABLET ORAL NIGHTLY
Qty: 30 TABLET | Refills: 3 | Status: SHIPPED | OUTPATIENT
Start: 2024-05-09

## 2024-05-21 ENCOUNTER — TELEPHONE (OUTPATIENT)
Dept: FAMILY MEDICINE | Facility: CLINIC | Age: 69
End: 2024-05-21

## 2024-05-21 PROCEDURE — 85025 COMPLETE CBC W/AUTO DIFF WBC: CPT | Performed by: NURSE PRACTITIONER

## 2024-05-21 PROCEDURE — 84443 ASSAY THYROID STIM HORMONE: CPT | Performed by: NURSE PRACTITIONER

## 2024-05-21 PROCEDURE — 83036 HEMOGLOBIN GLYCOSYLATED A1C: CPT | Performed by: NURSE PRACTITIONER

## 2024-05-21 PROCEDURE — 80053 COMPREHEN METABOLIC PANEL: CPT | Performed by: NURSE PRACTITIONER

## 2024-05-21 PROCEDURE — 82306 VITAMIN D 25 HYDROXY: CPT | Performed by: NURSE PRACTITIONER

## 2024-05-21 PROCEDURE — 80061 LIPID PANEL: CPT | Performed by: NURSE PRACTITIONER

## 2024-05-21 NOTE — TELEPHONE ENCOUNTER
Pt was here today for lab work and she stated she wasn't feeling good and hearing her heartbeat in her ears. I checked her BP and it was 162/84. She stated she is taking her lisinopril 5mg at night. I informed her I will speak with Kalie and see what she recommends and give her a call. Per Kalie pt can increase medication to 10mg. I called pt and notified her and tod her to take 2 tablets right now and see if that helps. Informed her to call us if things are better so I can send in new rx of lisinopril 10mg.     Pt verbally understood everything.

## 2024-05-23 ENCOUNTER — OFFICE VISIT (OUTPATIENT)
Dept: FAMILY MEDICINE | Facility: CLINIC | Age: 69
End: 2024-05-23
Payer: MEDICARE

## 2024-05-23 VITALS
SYSTOLIC BLOOD PRESSURE: 134 MMHG | HEIGHT: 67 IN | TEMPERATURE: 97 F | HEART RATE: 80 BPM | DIASTOLIC BLOOD PRESSURE: 78 MMHG | OXYGEN SATURATION: 94 % | BODY MASS INDEX: 24.17 KG/M2 | WEIGHT: 154 LBS

## 2024-05-23 DIAGNOSIS — M85.852 OSTEOPENIA OF NECK OF LEFT FEMUR: ICD-10-CM

## 2024-05-23 DIAGNOSIS — Z00.00 MEDICARE ANNUAL WELLNESS VISIT, SUBSEQUENT: ICD-10-CM

## 2024-05-23 DIAGNOSIS — I10 PRIMARY HYPERTENSION: ICD-10-CM

## 2024-05-23 PROCEDURE — G0439 PPPS, SUBSEQ VISIT: HCPCS | Mod: ,,, | Performed by: NURSE PRACTITIONER

## 2024-05-23 RX ORDER — LISINOPRIL 10 MG/1
10 TABLET ORAL DAILY
Qty: 90 TABLET | Refills: 3 | Status: SHIPPED | OUTPATIENT
Start: 2024-05-23 | End: 2025-05-23

## 2024-05-23 NOTE — PROGRESS NOTES
Patient ID: [id number]     Chief Complaint: Medicare Wellness        HPI:     Brandie Lobato is a 68 y.o. female here today for a Medicare Wellness.         Past Medical History:  has a past medical history of Fibrocystic breast, Heart murmur, Hypercholesteremia, Hypertension, Impaired fasting blood sugar, Insomnia, Mitral regurgitation, and Vitamin D deficiency.    Surgical History:  has a past surgical history that includes Adenoidectomy; Cholecystectomy; and Hysterectomy.    Family History: family history is not on file.    Social History:  reports that she has been smoking cigarettes. She has never used smokeless tobacco. She reports that she does not currently use alcohol. She reports that she does not use drugs.    Current Outpatient Medications   Medication Instructions    cholecalciferol, vitamin D3, 1,250 mcg (50,000 unit) capsule 50,000 Int'l Units, Oral, Weekly    famotidine (PEPCID) 20 mg, Oral, Nightly PRN    gabapentin (NEURONTIN) 300 MG capsule Oral    levocetirizine (XYZAL) 5 mg, Oral, Nightly    lisinopriL 10 mg, Oral, Daily    meloxicam (MOBIC) 7.5 mg, Oral, Daily PRN    methocarbamoL (ROBAXIN) 500 mg, Oral, Every 8 hours    ofloxacin (FLOXIN) 0.3 % otic solution 5 drops, Left Ear, Daily    rosuvastatin (CRESTOR) 10 mg, Oral, Daily    zolpidem (AMBIEN) 10 mg, Oral, Nightly       Patient has No Known Allergies.     Patient Care Team:  Kalie Little NP as PCP - General (Family Medicine)      Subjective:     Review of Systems    12 point review of systems conducted, negative except as stated in the history of present illness. See HPI for details.    Patient Reported Health Risk Assessments:  What [age] 70-79  Are you male or female?: Female  During the past four weeks, how much have you been bothered by emotional problems such as feeling anxious, depressed, irritable, sad, or downhearted and blue?: Not at all  During the past five weeks, has your physical and/or emotional health limited  your social activities with family, friends, neighbors, or groups?: Not at all  During the past four weeks, how much bodily pain have you generally had?: Moderate pain  During the past four weeks, was someone available to help if you needed and wanted help?: Yes, as much as I wanted  During the past four weeks, what was the hardest physical activity you could do for at least two minutes?: Light  Can you get to places out of walking distance without help?  (For example, can you travel alone on buses or taxis, or drive your own car?): Yes  Can you go shopping for groceries or clothes without someone's help?: Yes  Can you prepare your own meals?: Yes  Can you do your own housework without help?: Yes  Because of any health problems, do you need the help of another person with your personal care needs such as eating, bathing, dressing, or getting around the house?: No  Can you handle your own money without help?: Yes  During the past four weeks, how would you rate your health in general?: Good  How have things been going for you during the past four weeks?: Pretty well  Are you having difficulties driving your car?: No  Do you always fasten your seat belt when you are in a car?: Yes, usually  How often in the past four weeks have you been bothered by falling or dizzy when standing up?: Never  How often in the past four weeks have you been bothered by sexual problems?: Never  How often in the past four weeks have you been bothered by trouble eating well?: Never  How often in the past four weeks have you been bothered by teeth or denture problems?: Never  How often in the past four weeks have you been bothered with problems using the telephone?: Never  How often in the past four weeks have you been bothered by tiredness or fatigue?: Never  Have you fallen two or more times in the past year?: No  Are you afraid of falling?: No  Are you a smoker?: Yes, I'm not ready to quit  During the past four weeks, how many drinks of  "wine, beer, or other alcoholic beverages did you have?: No alcohol at all  Do you exercise for about 20 minutes three or more days a week?: No, I usually do not exercise this much  Have you been given any information to help you with hazards in your house that might hurt you?: No  Have you been given any information to help you with keeping track of your medications?: No  How often do you have trouble taking medicines the way you've been told to take them?: I always take them as prescribed  How confident are you that you can control and manage most of your health problems?: Very confident  What is your race? (Check all that apply.):     Objective:     Visit Vitals  /78   Pulse 80   Temp 97.4 °F (36.3 °C)   Ht 5' 7" (1.702 m)   Wt 69.9 kg (154 lb)   SpO2 (!) 94%   BMI 24.12 kg/m²       Physical Exam  Vitals and nursing note reviewed.   Constitutional:       General: She is not in acute distress.     Appearance: Normal appearance. She is not toxic-appearing.   HENT:      Head: Normocephalic and atraumatic.      Right Ear: Tympanic membrane, ear canal and external ear normal.      Left Ear: Tympanic membrane and external ear normal.      Nose: Nose normal.      Mouth/Throat:      Mouth: Mucous membranes are moist.      Pharynx: Oropharynx is clear.   Eyes:      Extraocular Movements: Extraocular movements intact.      Conjunctiva/sclera: Conjunctivae normal.      Pupils: Pupils are equal, round, and reactive to light.   Cardiovascular:      Rate and Rhythm: Normal rate and regular rhythm.      Heart sounds: Normal heart sounds. No murmur heard.     No friction rub. No gallop.   Pulmonary:      Effort: Pulmonary effort is normal.      Breath sounds: Normal breath sounds.   Musculoskeletal:         General: Normal range of motion.      Cervical back: Normal range of motion and neck supple.   Skin:     General: Skin is warm and dry.      Coloration: Skin is not jaundiced or pale.      Findings: No rash. "   Neurological:      General: No focal deficit present.      Mental Status: She is alert and oriented to person, place, and time. Mental status is at baseline.   Psychiatric:         Mood and Affect: Mood normal.         Behavior: Behavior normal.         Thought Content: Thought content normal.         Judgment: Judgment normal.             Labs Reviewed:     Chemistry:  Lab Results   Component Value Date     05/21/2024    K 4.6 05/21/2024    BUN 15 05/21/2024    CREATININE 0.70 05/21/2024    EGFRNORACEVR >90 05/21/2024    GLUCOSE 95 05/21/2024    CALCIUM 9.6 05/21/2024    ALKPHOS 66 05/21/2024    LABPROT 7.0 05/21/2024    ALBUMIN 4.4 05/21/2024    AST 24 05/21/2024    ALT 21 05/21/2024    [id number] 34 05/21/2024        Lab Results   Component Value Date    HGBA1C 5.7 05/21/2024        Hematology:  Lab Results   Component Value Date    WBC 8.76 05/21/2024    HGB 14.8 05/21/2024    HCT 43.3 05/21/2024     05/21/2024       Lipid Panel:  Lab Results   Component Value Date    CHOL 128 05/21/2024    HDL 50 05/21/2024    TRIG 100 05/21/2024          Assessment:       ICD-10-CM ICD-9-CM   1. Medicare annual wellness visit, subsequent  Z00.00 V70.0   2. Primary hypertension  I10 401.9   3. Osteopenia of neck of left femur  M85.852 733.90        Plan:     Medicare Annual Wellness and Personalized Prevention Plan:     Breast Cancer Screening - declines.  Colon Cancer Screening - declines.   Osteoporosis Screening - completed 4/13/2023.  Eye Exam - recommend annually.   Dental Exam - Recommend biannually  Vaccinations -   Immunization History   Administered Date(s) Administered    COVID-19 MRNA, LN-S PF (MODERNA HALF 0.25 ML DOSE) 12/10/2021    COVID-19 Vaccine 02/11/2021, 03/11/2021, 12/10/2021    COVID-19, MRNA, LN-S, PF (MODERNA FULL 0.5 ML DOSE) 02/11/2021, 03/11/2021    Influenza 09/15/2015, 09/20/2016, 09/27/2017, 10/07/2021    Influenza (FLUAD) - Quadrivalent - Adjuvanted - PF *Preferred* (65+)  10/21/2020, 10/23/2023    Influenza - High Dose - PF (65 years and older) 09/18/2018, 09/25/2019, 11/15/2022    Influenza - Quadrivalent - PF *Preferred* (6 months and older) 10/07/2021    Influenza - Trivalent (ADULT) 09/27/2017, 10/07/2021    Influenza - Trivalent - PF (ADULT) 09/15/2015, 09/20/2016, 09/24/2019    Pneumococcal Conjugate - 13 Valent 07/27/2018    Pneumococcal Polysaccharide - 23 Valent 09/24/2019, 09/25/2019    Zoster Recombinant 09/18/2018, 11/29/2018        Fall Risk + Home Safety + Living Situation + Whisper Test + Depression Screen + CAGE + Cognitive Impairment Screen + ADL Screen + Timed Get Up and Go + Nutrition Screen + PAQ Screen + Health Risk Assessment all reviewed.          No data to display                  5/23/2024    10:30 AM 3/19/2024     2:00 PM 2/12/2024     4:30 PM 2/6/2024     4:30 PM 12/18/2023     2:30 PM 9/12/2023     3:00 PM 8/29/2023     3:30 PM   Fall Risk Assessment - Outpatient   Mobility Status Ambulatory Ambulatory Ambulatory Ambulatory Ambulatory Ambulatory Ambulatory   Number of falls 0 0 0 0 0 0 0   Identified as fall risk False False False False False False False                   Depression Screening  Over the past two weeks, has the patient felt down, depressed, or hopeless?: No  Over the past two weeks, has the patient felt little interest or pleasure in doing things?: No  Functional Ability/Safety Screening  Was the patient's timed Up & Go test unsteady or longer than 30 seconds?: No  Does the patient need help with phone, transportation, shopping, preparing meals, housework, laundry, meds, or managing money?: No  Does the patient's home have rugs in the hallway, lack grab bars in the bathroom, lack handrails on the stairs or have poor lighting?: No  Have you noticed any hearing difficulties?: No  Cognitive Function (Assessed through direct observation with due consideration of information obtained by way of patient reports and/or concerns raised by family,  friends, caretakers, or others)    Does the patient repeat questions/statements in the same day?: No  Does the patient have trouble remembering the date, year, and time?: No  Does the patient have difficulty managing finances?: No  Does the patient have a decreased sense of direction?: No      Offer of free  was accepted or rejected?: rejected  If  rejected, why?: Patient states understands English    CVD Risk Factors - Reviewed  Obesity/Physical Activity - Normal BMI. Encouraged daily 30 minute physical activity x 5 days per week.    Opioid Screening: Patient medication list reviewed, patient is not taking prescription opioids. Patient is not using additional opioids than prescribed. Patient is not at low risk of substance abuse based on this opioid use history.       Advance Directives:   Living Will: No    Do Not Resuscitate Status: No    Medical Power of : No      Decision Making:  Patient answered questions  Goals of Care: The patient endorses that what is most important right now is to focus on remaining as independent as possible    Accordingly, we have decided that the best plan to meet the patient's goals includes continuing with treatment and no further escalation in treatment    Advance Care Planning   Advanced Care Planning: I offered to discuss Advanced Care Planning, which consists of how you would like to be cared for as you end the near of your natural life.  This includes how to pick a person who would make decisions for you if you were unable to make them for yourself, which is called a Medical Power of . These discussions include what kind of decisions you will make regarding life sustaining measures, such as ventilators and feeding tubes, when faced with a life limiting illness recorded on a living will that they will need to know. Spent 20 minutes with the patient/family on the importance of Advanced Care Planning.        The patient's Health  Maintenance was reviewed and the following appears to be due at this time:   There are no preventive care reminders to display for this patient.      Follow up in about 3 months (around 8/23/2024) for ESTmob eval, BP Check. In addition to their scheduled follow up, the patient has also been instructed to follow up on as needed basis.     Future Appointments   Date Time Provider Department Center   8/22/2024  9:30 AM Kalie Little NP [facility]        Provided patient with a 5-10 year written screening schedule and personal prevention plan. Recommendations were developed using the USPSTF age appropriate recommendations. Education, counseling, and referrals were provided as needed. After Visit Summary printed and given to patient which includes a list of additional screenings\tests needed.    Kalie Little NP

## 2024-08-22 ENCOUNTER — OFFICE VISIT (OUTPATIENT)
Dept: FAMILY MEDICINE | Facility: CLINIC | Age: 69
End: 2024-08-22
Payer: MEDICARE

## 2024-08-22 VITALS
TEMPERATURE: 98 F | BODY MASS INDEX: 23.86 KG/M2 | DIASTOLIC BLOOD PRESSURE: 68 MMHG | SYSTOLIC BLOOD PRESSURE: 136 MMHG | HEART RATE: 77 BPM | WEIGHT: 152 LBS | OXYGEN SATURATION: 96 % | HEIGHT: 67 IN

## 2024-08-22 DIAGNOSIS — M25.562 CHRONIC PAIN OF BOTH KNEES: ICD-10-CM

## 2024-08-22 DIAGNOSIS — F51.04 PSYCHOPHYSIOLOGICAL INSOMNIA: ICD-10-CM

## 2024-08-22 DIAGNOSIS — G89.29 CHRONIC PAIN OF BOTH KNEES: ICD-10-CM

## 2024-08-22 DIAGNOSIS — K21.9 GASTROESOPHAGEAL REFLUX DISEASE WITHOUT ESOPHAGITIS: ICD-10-CM

## 2024-08-22 DIAGNOSIS — M25.561 CHRONIC PAIN OF BOTH KNEES: ICD-10-CM

## 2024-08-22 DIAGNOSIS — J30.89 ENVIRONMENTAL AND SEASONAL ALLERGIES: Primary | ICD-10-CM

## 2024-08-22 PROCEDURE — 99214 OFFICE O/P EST MOD 30 MIN: CPT | Mod: ,,, | Performed by: NURSE PRACTITIONER

## 2024-08-22 RX ORDER — DOXYLAMINE SUCCINATE AND PHENYLEPHRINE HYDROCHLORIDE 10.5; 1 MG/1; MG/1
1 TABLET ORAL DAILY PRN
Qty: 30 TABLET | Refills: 3 | Status: SHIPPED | OUTPATIENT
Start: 2024-08-22

## 2024-08-22 RX ORDER — MELOXICAM 7.5 MG/1
7.5 TABLET ORAL DAILY PRN
Qty: 30 TABLET | Refills: 0 | Status: SHIPPED | OUTPATIENT
Start: 2024-08-22

## 2024-08-22 RX ORDER — FAMOTIDINE 20 MG/1
20 TABLET, FILM COATED ORAL NIGHTLY PRN
Qty: 60 TABLET | Refills: 11 | Status: SHIPPED | OUTPATIENT
Start: 2024-08-22

## 2024-08-22 NOTE — PROGRESS NOTES
"Patient ID: Brandie Lobato  : 1955    Chief Complaint: Sinusitis and Follow-up    Allergies: Patient has No Known Allergies.     History of Present Illness:  Patient presents to the clinic for BP check and with complaints sinus drainage x1 week.  Denies shortness or breath, difficulty breathing, fever, chills.    Social History:  reports that she has been smoking cigarettes. She has never used smokeless tobacco. She reports that she does not currently use alcohol. She reports that she does not use drugs.    Past Medical History:  has a past medical history of Heart murmur, Hypertension, Impaired fasting blood sugar, Insomnia, Mitral regurgitation, and Vitamin D deficiency.    Surgical History:   Past Surgical History:   Procedure Laterality Date    ADENOIDECTOMY      CHOLECYSTECTOMY      HYSTERECTOMY         Current Medications:  Current Outpatient Medications   Medication Instructions    amoxicillin (AMOXIL) 500 mg, Oral, Every 8 hours    cholecalciferol, vitamin D3, 1,250 mcg (50,000 unit) capsule 50,000 Int'l Units, Oral, Weekly    doxylamine-phenylephrine (POLY HIST FORTE) 10.5-10 mg Tab 1 tablet, Oral, Daily PRN    famotidine (PEPCID) 20 mg, Oral, Nightly PRN    gabapentin (NEURONTIN) 300 MG capsule Oral    levocetirizine (XYZAL) 5 mg, Oral, Nightly    lisinopriL 10 mg, Oral, Daily    meloxicam (MOBIC) 7.5 mg, Oral, Daily PRN    methocarbamoL (ROBAXIN) 500 mg, Oral, Every 8 hours    ofloxacin (FLOXIN) 0.3 % otic solution 5 drops, Left Ear, Daily    rosuvastatin (CRESTOR) 10 mg, Oral, Daily    zolpidem (AMBIEN) 10 mg, Oral, Nightly       Review of Systems   A comprehensive review of symptoms was completed and negative except as noted above.    Visit Vitals  /68   Pulse 77   Temp 98 °F (36.7 °C)   Ht 5' 7" (1.702 m)   Wt 68.9 kg (152 lb)   SpO2 96%   BMI 23.81 kg/m²       Physical Exam  Vitals and nursing note reviewed.   Constitutional:       General: She is not in acute distress.     Appearance: " Normal appearance. She is not toxic-appearing.   HENT:      Head: Normocephalic and atraumatic.      Right Ear: Tympanic membrane, ear canal and external ear normal.      Left Ear: Tympanic membrane, ear canal and external ear normal.      Ears:      Comments: Right ear blue PE tube in place. Left ear white PE tube dislodged, no s/s of infection.      Nose: Rhinorrhea (mild, clear) present.      Mouth/Throat:      Mouth: Mucous membranes are moist.      Pharynx: Oropharynx is clear.   Eyes:      Extraocular Movements: Extraocular movements intact.      Conjunctiva/sclera: Conjunctivae normal.      Pupils: Pupils are equal, round, and reactive to light.   Cardiovascular:      Rate and Rhythm: Normal rate and regular rhythm.      Heart sounds: Murmur heard.      No friction rub. No gallop.   Pulmonary:      Effort: Pulmonary effort is normal.      Breath sounds: Wheezing present.   Musculoskeletal:         General: Normal range of motion.      Cervical back: Normal range of motion and neck supple.   Skin:     General: Skin is warm and dry.      Coloration: Skin is not jaundiced or pale.      Findings: No rash.   Neurological:      General: No focal deficit present.      Mental Status: She is alert and oriented to person, place, and time. Mental status is at baseline.   Psychiatric:         Mood and Affect: Mood normal.         Behavior: Behavior normal.         Thought Content: Thought content normal.         Judgment: Judgment normal.          Labs Reviewed:  Chemistry:  Lab Results   Component Value Date     05/21/2024    K 4.6 05/21/2024    BUN 15 05/21/2024    CREATININE 0.70 05/21/2024    EGFRNORACEVR >90 05/21/2024    GLUCOSE 95 05/21/2024    CALCIUM 9.6 05/21/2024    ALKPHOS 66 05/21/2024    LABPROT 7.0 05/21/2024    ALBUMIN 4.4 05/21/2024    AST 24 05/21/2024    ALT 21 05/21/2024    [id number] 34 05/21/2024    TSH 1.570 05/21/2024        Lab Results   Component Value Date    HGBA1C 5.7 05/21/2024         Hematology:  Lab Results   Component Value Date    WBC 8.76 05/21/2024    RBC 4.69 05/21/2024    HGB 14.8 05/21/2024    HCT 43.3 05/21/2024    MCV 92.3 05/21/2024    MCH 31.6 05/21/2024    MCHC 34.2 05/21/2024    RDW 13.3 05/21/2024     05/21/2024    MPV 12.5 (H) 05/21/2024       Lipid Panel:  Lab Results   Component Value Date    CHOL 128 05/21/2024    HDL 50 05/21/2024    TRIG 100 05/21/2024        Assessment & Plan:  1. Environmental and seasonal allergies  -  rx   doxylamine-phenylephrine (POLY HIST FORTE) 10.5-10 mg Tab; Take 1 tablet by mouth daily as needed (allergies).  Dispense: 30 tablet; Refill: 3    2. Gastroesophageal reflux disease without esophagitis  -  refill   famotidine (PEPCID) 20 MG tablet; Take 1 tablet (20 mg total) by mouth nightly as needed for Heartburn.  Dispense: 60 tablet; Refill: 11    3. Chronic pain of both knees  -  rx   meloxicam (MOBIC) 7.5 MG tablet; Take 1 tablet (7.5 mg total) by mouth daily as needed for Pain.  Dispense: 30 tablet; Refill: 0    4. Psychophysiological insomnia  Controlled with Ambien 10mg po qhs.       Follow up in about 3 months (around 11/22/2024) for 3m med eval, Follow Up, BP Check.   Return to the clinic as needed.    Future Appointments   Date Time Provider Department Center   11/21/2024  2:30 PM Kalie Little NP [facility]       Lab Frequency Next Occurrence   Ambulatory referral/consult to Pulmonology Once 09/11/2023   Ambulatory referral/consult to Podiatry Once 09/19/2023   Ambulatory referral/consult to ENT Once 02/13/2024   CT Chest Lung Screening Low Dose Once 04/19/2024   DXA Bone Density Axial Skeleton 1 or more sites Once 05/23/2024        LISSY Lima-GRACIELA

## 2024-08-26 ENCOUNTER — OFFICE VISIT (OUTPATIENT)
Dept: FAMILY MEDICINE | Facility: CLINIC | Age: 69
End: 2024-08-26
Payer: MEDICARE

## 2024-08-26 VITALS
WEIGHT: 152 LBS | HEIGHT: 67 IN | OXYGEN SATURATION: 95 % | HEART RATE: 73 BPM | TEMPERATURE: 97 F | SYSTOLIC BLOOD PRESSURE: 138 MMHG | BODY MASS INDEX: 23.86 KG/M2 | DIASTOLIC BLOOD PRESSURE: 79 MMHG | RESPIRATION RATE: 20 BRPM

## 2024-08-26 DIAGNOSIS — J02.0 PHARYNGITIS DUE TO STREPTOCOCCUS SPECIES: Primary | ICD-10-CM

## 2024-08-26 DIAGNOSIS — B37.9 YEAST INFECTION: ICD-10-CM

## 2024-08-26 PROCEDURE — 99213 OFFICE O/P EST LOW 20 MIN: CPT | Mod: ,,, | Performed by: NURSE PRACTITIONER

## 2024-08-26 RX ORDER — AMOXICILLIN 500 MG/1
500 TABLET, FILM COATED ORAL EVERY 8 HOURS
Qty: 30 TABLET | Refills: 0 | Status: SHIPPED | OUTPATIENT
Start: 2024-08-26 | End: 2024-09-05

## 2024-08-26 RX ORDER — FLUCONAZOLE 150 MG/1
150 TABLET ORAL DAILY
Qty: 2 TABLET | Refills: 0 | Status: SHIPPED | OUTPATIENT
Start: 2024-08-26 | End: 2024-08-28

## 2024-08-26 NOTE — PROGRESS NOTES
"SUBJECTIVE:  Brandie Lobato is a 69 y.o. female here for Sore Throat      HPI  Patient presents to the clinic with c/o sore throat since yesterday. Denies shortness of breath or difficulty breathing. + sick contact.     Claires allergies, medications, history, and problem list were updated as appropriate.    Review of Systems   A comprehensive review of symptoms was completed and negative except as noted above.    OBJECTIVE:  Vital signs  Vitals:    08/26/24 1519   BP: 138/79   BP Location: Left arm   Patient Position: Sitting   Pulse: 73   Resp: 20   Temp: 97 °F (36.1 °C)   SpO2: 95%   Weight: 68.9 kg (152 lb)   Height: 5' 7" (1.702 m)        Physical Exam  Vitals and nursing note reviewed.   Constitutional:       General: She is not in acute distress.     Appearance: Normal appearance. She is not toxic-appearing.   HENT:      Head: Normocephalic and atraumatic.      Right Ear: Tympanic membrane, ear canal and external ear normal.      Left Ear: Tympanic membrane, ear canal and external ear normal.      Ears:      Comments: Right ear blue PE tube in place. Left ear white PE tube dislodged.      Nose: Nose normal.      Mouth/Throat:      Mouth: Mucous membranes are moist.      Pharynx: Oropharynx is clear.   Eyes:      Extraocular Movements: Extraocular movements intact.      Conjunctiva/sclera: Conjunctivae normal.      Pupils: Pupils are equal, round, and reactive to light.   Cardiovascular:      Rate and Rhythm: Normal rate and regular rhythm.      Heart sounds: Normal heart sounds. No murmur heard.     No friction rub. No gallop.   Pulmonary:      Effort: Pulmonary effort is normal.      Breath sounds: Normal breath sounds.   Musculoskeletal:         General: Normal range of motion.      Cervical back: Normal range of motion and neck supple.   Skin:     General: Skin is warm and dry.      Coloration: Skin is not jaundiced or pale.      Findings: No rash.   Neurological:      General: No focal deficit present. "      Mental Status: She is alert and oriented to person, place, and time. Mental status is at baseline.   Psychiatric:         Mood and Affect: Mood normal.         Behavior: Behavior normal.         Thought Content: Thought content normal.         Judgment: Judgment normal.          No visits with results within 1 Day(s) from this visit.   Latest known visit with results is:   Lab Visit on 05/21/2024   Component Date Value Ref Range Status    Cholesterol Total 05/21/2024 128  0 - 200 mg/dL Final    HDL Cholesterol 05/21/2024 50  40 - 60 mg/dL Final    Triglyceride 05/21/2024 100  30 - 200 mg/dL Final    LDL Cholesterol Direct 05/21/2024 71.1  30.0 - 100.0 mg/dL Final    Hemoglobin A1c 05/21/2024 5.7  4.0 - 6.0 % Final    Estimated Average Glucose 05/21/2024 116.9 (H)  70.0 - 115.0 mg/dL Final    Vitamin D 05/21/2024 34  30 - 80 ng/mL Final    TSH 05/21/2024 1.570  0.360 - 3.740 uIU/mL Final    Sodium 05/21/2024 141  136 - 145 mmol/L Final    Potassium 05/21/2024 4.6  3.5 - 5.1 mmol/L Final    Chloride 05/21/2024 107  98 - 110 mmol/L Final    CO2 05/21/2024 28  21 - 32 mmol/L Final    Glucose 05/21/2024 95  70 - 115 mg/dL Final    Blood Urea Nitrogen 05/21/2024 15  7.0 - 20.0 mg/dL Final    Creatinine 05/21/2024 0.70  0.66 - 1.25 mg/dL Final    Calcium 05/21/2024 9.6  8.4 - 10.2 mg/dL Final    Protein Total 05/21/2024 7.0  6.3 - 8.2 gm/dL Final    Albumin 05/21/2024 4.4  3.4 - 5.0 g/dL Final    Globulin 05/21/2024 2.6  2.0 - 3.9 gm/dL Final    Albumin/Globulin Ratio 05/21/2024 1.7  ratio Final    Bilirubin Total 05/21/2024 0.4  0.0 - 1.0 mg/dL Final    ALP 05/21/2024 66  50 - 144 unit/L Final    ALT 05/21/2024 21  1 - 45 unit/L Final    AST 05/21/2024 24  14 - 36 unit/L Final    eGFR 05/21/2024 >90  mL/min/1.73/m2 Final                         EGFR INTERPRETATION    Beginning 8/15/22 we are reporting the eGFRcr calculation as recommended by the [employer]. The eGFRcr equation has similar overall  performance characteristics to the older equation, but the values may differ by more than 10% particularly at higher values of eGFRcr and younger adult ages.    NKF stages of chronic kidney disease (CKD)  Stage 1: Kidney damage with normal or increased eGFR (>90 mL/min/1.73 m^2)  Stage 2: Mild reduction in GFR (60-89 mL/min/1.73 m^2)  Stage 3a: Moderate reduction in GFR (45-59 mL/min/1.73 m^2)  Stage 3b: Moderate reduction in GFR (30-44 mL/min/1.73 m^2)  Stage 4: Severe reduction in GFR (15-29 mL/min/1.73 m^2)  Stage 5: Kidney failure (GFR <15 mL/min/1.73 m^2)        Anion Gap 05/21/2024 6.0  2.0 - 13.0 mEq/L Final    BUN/Creatinine Ratio 05/21/2024 21 (H)  12 - 20 Final    WBC 05/21/2024 8.76  4.00 - 11.50 x10(3)/mcL Final    RBC 05/21/2024 4.69  4.00 - 5.10 x10(6)/mcL Final    Hgb 05/21/2024 14.8  11.8 - 16.0 g/dL Final    Hct 05/21/2024 43.3  36.0 - 48.0 % Final    MCV 05/21/2024 92.3  79.0 - 99.0 fL Final    MCH 05/21/2024 31.6  27.0 - 34.0 pg Final    MCHC 05/21/2024 34.2  31.0 - 37.0 g/dL Final    RDW 05/21/2024 13.3  11.0 - 14.5 % Final    Platelet 05/21/2024 158  140 - 371 x10(3)/mcL Final    MPV 05/21/2024 12.5 (H)  9.4 - 12.4 fL Final    Neut % 05/21/2024 58.7  37 - 73 % Final    Lymph % 05/21/2024 29.3  20 - 55 % Final    Mono % 05/21/2024 7.0  4.7 - 12.5 % Final    Eos % 05/21/2024 3.8  0.7 - 7 % Final    Basophil % 05/21/2024 0.7  0.1 - 1.2 % Final    Lymph # 05/21/2024 2.57  1.16 - 3.74 x10(3)/mcL Final    Neut # 05/21/2024 5.15  1.56 - 6.13 x10(3)/mcL Final    Mono # 05/21/2024 0.61 (H)  0.24 - 0.36 x10(3)/mcL Final    Eos # 05/21/2024 0.33  0.04 - 0.36 x10(3)/mcL Final    Baso # 05/21/2024 0.06  0.01 - 0.08 x10(3)/mcL Final    IG# 05/21/2024 0.04 (H)  0.0001 - 0.031 x10(3)/mcL Final    IG% 05/21/2024 0.5  0 - 0.5 % Final    NRBC% 05/21/2024 0.0  <=1 % Final          ASSESSMENT/PLAN:    1. Pharyngitis due to Streptococcus species  -     POCT rapid strep A-  positive  -     amoxicillin (AMOXIL) 500 MG  Tab; Take 1 tablet (500 mg total) by mouth every 8 (eight) hours. for 10 days  Dispense: 30 tablet; Refill: 0    2. Yeast infection  -     fluconazole (DIFLUCAN) 150 MG Tab; Take 1 tablet (150 mg total) by mouth once daily. for 2 days  Dispense: 2 tablet; Refill: 0          Follow Up:  Follow up if symptoms worsen or fail to improve.    If a referral was sent and you are not notified and scheduled with specialist within 2 weeks, please notify office to ensure specialty appointment is scheduled in a timely manner.   Discussed the diagnosis, prognosis, plan of care, chronic nature of and indications for, risks and benefits of treatment for conditions.  Continue all medications as prescribed unless otherwise noted.   Call if patient develops other symptoms or if not better in 2-3 days and sooner if worse. Emphasized the importance of compliance with all recommendations, including medication use and timely follow up. Instructed to return as noted be or sooner if patient develops any other problems or if there are any other additional questions or concerns.

## 2024-08-29 ENCOUNTER — LAB VISIT (OUTPATIENT)
Dept: FAMILY MEDICINE | Facility: CLINIC | Age: 69
End: 2024-08-29
Payer: MEDICARE

## 2024-08-29 DIAGNOSIS — J02.0 PHARYNGITIS DUE TO STREPTOCOCCUS SPECIES: Primary | ICD-10-CM

## 2024-08-29 RX ORDER — DEXAMETHASONE SODIUM PHOSPHATE 4 MG/ML
8 INJECTION, SOLUTION INTRA-ARTICULAR; INTRALESIONAL; INTRAMUSCULAR; INTRAVENOUS; SOFT TISSUE
Status: COMPLETED | OUTPATIENT
Start: 2024-08-29 | End: 2024-08-29

## 2024-08-29 RX ORDER — CEFTRIAXONE 1 G/1
1 INJECTION, POWDER, FOR SOLUTION INTRAMUSCULAR; INTRAVENOUS
Status: COMPLETED | OUTPATIENT
Start: 2024-08-29 | End: 2024-08-29

## 2024-08-29 RX ADMIN — DEXAMETHASONE SODIUM PHOSPHATE 8 MG: 4 INJECTION, SOLUTION INTRA-ARTICULAR; INTRALESIONAL; INTRAMUSCULAR; INTRAVENOUS; SOFT TISSUE at 11:08

## 2024-08-29 RX ADMIN — CEFTRIAXONE 1 G: 1 INJECTION, POWDER, FOR SOLUTION INTRAMUSCULAR; INTRAVENOUS at 11:08

## 2024-08-29 NOTE — PROGRESS NOTES
Patient came in today for a steroid injection along with abx injection. Pt received a decamethasone 6mg into the right dorsal gluteal. She also received a ceftriazone 1g in the left dorsal gluteal. Pt tolerated both injections well.

## 2024-09-03 DIAGNOSIS — F51.04 PSYCHOPHYSIOLOGICAL INSOMNIA: ICD-10-CM

## 2024-09-04 RX ORDER — ZOLPIDEM TARTRATE 10 MG/1
10 TABLET ORAL NIGHTLY
Qty: 30 TABLET | Refills: 3 | Status: SHIPPED | OUTPATIENT
Start: 2024-09-04

## 2024-09-12 DIAGNOSIS — D35.00 BENIGN NEOPLASM OF UNSPECIFIED ADRENAL GLAND: Primary | ICD-10-CM

## 2024-09-25 ENCOUNTER — CLINICAL SUPPORT (OUTPATIENT)
Dept: FAMILY MEDICINE | Facility: CLINIC | Age: 69
End: 2024-09-25
Payer: MEDICARE

## 2024-09-25 DIAGNOSIS — Z23 IMMUNIZATION DUE: Primary | ICD-10-CM

## 2024-09-25 NOTE — PROGRESS NOTES
Patient is here today for flu vaccine. Fluad given in left deltoid. Pt tolerated well.    Lidocaine-epinephrine 1-1:[id number] % injection   1.5 mL once for one use, starting 3/21/2023 ending 3/21/2023,  2mL disp, R-0, injection  Injected by Dr. Cole

## 2024-09-30 ENCOUNTER — HOSPITAL ENCOUNTER (OUTPATIENT)
Dept: RADIOLOGY | Facility: HOSPITAL | Age: 69
Discharge: HOME | End: 2024-09-30
Attending: UROLOGY
Payer: MEDICARE

## 2024-09-30 DIAGNOSIS — D35.00 BENIGN NEOPLASM OF UNSPECIFIED ADRENAL GLAND: ICD-10-CM

## 2024-09-30 PROCEDURE — 25500020 PHARM REV CODE 255: Performed by: UROLOGY

## 2024-09-30 PROCEDURE — 74183 MRI ABD W/O CNTR FLWD CNTR: CPT | Mod: TC

## 2024-09-30 PROCEDURE — A9577 INJ MULTIHANCE: HCPCS | Performed by: UROLOGY

## 2024-09-30 RX ADMIN — GADOBENATE DIMEGLUMINE 15 ML: 529 INJECTION, SOLUTION INTRAVENOUS at 09:09

## 2024-10-16 ENCOUNTER — OFFICE VISIT (OUTPATIENT)
Dept: FAMILY MEDICINE | Facility: CLINIC | Age: 69
End: 2024-10-16
Payer: MEDICARE

## 2024-10-16 VITALS
HEIGHT: 67 IN | DIASTOLIC BLOOD PRESSURE: 82 MMHG | BODY MASS INDEX: 24.23 KG/M2 | SYSTOLIC BLOOD PRESSURE: 138 MMHG | WEIGHT: 154.38 LBS | HEART RATE: 81 BPM | OXYGEN SATURATION: 95 % | TEMPERATURE: 97 F

## 2024-10-16 DIAGNOSIS — J06.9 VIRAL URI: Primary | Chronic | ICD-10-CM

## 2024-10-16 DIAGNOSIS — J02.8 SORE THROAT (VIRAL): ICD-10-CM

## 2024-10-16 DIAGNOSIS — B97.89 SORE THROAT (VIRAL): ICD-10-CM

## 2024-10-16 LAB
CTP QC/QA: YES
CTP QC/QA: YES
FLUAV AG NPH QL: NEGATIVE
FLUBV AG NPH QL: NEGATIVE
SARS-COV-2 AG RESP QL IA.RAPID: NEGATIVE

## 2024-10-16 NOTE — PROGRESS NOTES
"Patient ID: Brandie Lobato  : 1955    Chief Complaint: Sore Throat    Allergies: Patient has No Known Allergies.     History of Present Illness:  The patient is a 69 y.o. White female who presents to clinic for follow up on Sore Throat. She complains of sore throat, pain with swallowing, cough, and chills that began upon waking this morning. Mild improvement with acetaminophen. Denies wheezing, sputum production, vomiting, or diarrhea.  No shortness of breath. Grandchild with similar symptoms.     Social History:  reports that she has been smoking cigarettes. She has never used smokeless tobacco. She reports that she does not currently use alcohol. She reports that she does not use drugs.    Past Medical History:  has a past medical history of Heart murmur, Hyperlipidemia, Hypertension, Impaired fasting blood sugar, Insomnia, Mitral regurgitation, and Vitamin D deficiency.    Current Medications:  Current Outpatient Medications   Medication Instructions    cholecalciferol, vitamin D3, 1,250 mcg (50,000 unit) capsule 50,000 Int'l Units, Weekly    doxylamine-phenylephrine (POLY HIST FORTE) 10.5-10 mg Tab 1 tablet, Oral, Daily PRN    famotidine (PEPCID) 20 mg, Oral, Nightly PRN    gabapentin (NEURONTIN) 300 MG capsule Take by mouth.    levocetirizine (XYZAL) 5 mg, Oral, Nightly    lisinopriL 10 mg, Oral, Daily    meloxicam (MOBIC) 7.5 mg, Oral, Daily PRN    methocarbamoL (ROBAXIN) 500 mg, Every 8 hours    ofloxacin (FLOXIN) 0.3 % otic solution 5 drops, Left Ear, Daily    rosuvastatin (CRESTOR) 10 mg, Daily    zolpidem (AMBIEN) 10 mg, Oral, Nightly       ROS: See HPI    Visit Vitals  /82   Pulse 81   Temp 97.4 °F (36.3 °C)   Ht 5' 7" (1.702 m)   Wt 70 kg (154 lb 6.4 oz)   SpO2 95%   BMI 24.18 kg/m²       Physical Exam  Vitals reviewed.   Constitutional:       Appearance: Normal appearance.   HENT:      Mouth/Throat:      Pharynx: Posterior oropharyngeal erythema present. No oropharyngeal exudate.      " Comments: Uvula midline  Cardiovascular:      Rate and Rhythm: Normal rate and regular rhythm.      Heart sounds: Murmur heard.   Pulmonary:      Effort: Pulmonary effort is normal.      Breath sounds: Normal breath sounds.   Musculoskeletal:      Cervical back: Normal range of motion and neck supple. Tenderness present.   Lymphadenopathy:      Cervical: Cervical adenopathy present.   Skin:     General: Skin is warm and dry.   Neurological:      Mental Status: She is alert and oriented to person, place, and time. Mental status is at baseline.        Influenza: negative  COVID: negative    Assessment & Plan:  1. Viral URI    2. Sore throat (viral)  -     POCT Influenza A/B  -     SARS Coronavirus 2 Antigen, POCT Manual Read     Discussed symptomatic treatment with over-the-counter medications   Rest, hydration   Call office if symptoms worsen over the next 7-10 days   ER precautions given    Future Appointments   Date Time Provider Department Center   10/16/2024  3:[facility]   11/21/2024  2:30 PM Kalie Little NP [facility]       No follow-ups on file. Call sooner if needed.    LISSY Jensen    Lab Frequency Next Occurrence   Ambulatory referral/consult to ENT Once 02/13/2024   CT Chest Lung Screening Low Dose Once 04/19/2024   DXA Bone Density Axial Skeleton 1 or more sites Once 05/23/2024

## 2024-11-21 ENCOUNTER — OFFICE VISIT (OUTPATIENT)
Dept: FAMILY MEDICINE | Facility: CLINIC | Age: 69
End: 2024-11-21
Payer: MEDICARE

## 2024-11-21 VITALS
HEART RATE: 85 BPM | TEMPERATURE: 98 F | WEIGHT: 150 LBS | OXYGEN SATURATION: 94 % | SYSTOLIC BLOOD PRESSURE: 132 MMHG | BODY MASS INDEX: 23.54 KG/M2 | DIASTOLIC BLOOD PRESSURE: 82 MMHG | HEIGHT: 67 IN

## 2024-11-21 DIAGNOSIS — F51.04 PSYCHOPHYSIOLOGICAL INSOMNIA: Primary | ICD-10-CM

## 2024-11-21 DIAGNOSIS — I10 PRIMARY HYPERTENSION: ICD-10-CM

## 2024-11-21 PROCEDURE — G2211 COMPLEX E/M VISIT ADD ON: HCPCS | Mod: ,,, | Performed by: NURSE PRACTITIONER

## 2024-11-21 PROCEDURE — 99214 OFFICE O/P EST MOD 30 MIN: CPT | Mod: ,,, | Performed by: NURSE PRACTITIONER

## 2024-11-21 NOTE — PROGRESS NOTES
"Patient ID: Brandie Lobato  : 1955    Chief Complaint: Follow-up (3 month follow up. ) and Hypertension (BP check )    Allergies: Patient has No Known Allergies.     History of Present Illness:  The patient presents to clinic for Follow-up (3 month follow up. ) and Hypertension (BP check )     Social History:  reports that she has been smoking cigarettes. She has never used smokeless tobacco. She reports that she does not currently use alcohol. She reports that she does not use drugs.    Past Medical History:  has a past medical history of Heart murmur, Hyperlipidemia, Hypertension, Impaired fasting blood sugar, Insomnia, Mitral regurgitation, and Vitamin D deficiency.    Surgical History:   Past Surgical History:   Procedure Laterality Date    ADENOIDECTOMY      CHOLECYSTECTOMY      HYSTERECTOMY         Current Medications:  Current Outpatient Medications   Medication Instructions    cholecalciferol, vitamin D3, 1,250 mcg (50,000 unit) capsule 50,000 Int'l Units, Weekly    famotidine (PEPCID) 20 mg, Oral, Nightly PRN    gabapentin (NEURONTIN) 300 MG capsule Take by mouth.    levocetirizine (XYZAL) 5 mg, Oral, Nightly    lisinopriL 10 mg, Oral, Daily    meloxicam (MOBIC) 7.5 mg, Oral, Daily PRN    methocarbamoL (ROBAXIN) 500 mg, Every 8 hours    rosuvastatin (CRESTOR) 10 mg, Daily    zolpidem (AMBIEN) 10 mg, Oral, Nightly       Review of Systems   A comprehensive review of symptoms was completed and negative except as noted above.    Visit Vitals  /82   Pulse 85   Temp 97.7 °F (36.5 °C)   Ht 5' 7" (1.702 m)   Wt 68 kg (150 lb)   SpO2 (!) 94%   BMI 23.49 kg/m²       Physical Exam  Vitals and nursing note reviewed.   Constitutional:       General: She is not in acute distress.     Appearance: Normal appearance. She is not toxic-appearing.   HENT:      Head: Normocephalic and atraumatic.      Nose: Nose normal.      Mouth/Throat:      Mouth: Mucous membranes are moist.      Pharynx: Oropharynx is clear. "   Eyes:      Extraocular Movements: Extraocular movements intact.      Conjunctiva/sclera: Conjunctivae normal.      Pupils: Pupils are equal, round, and reactive to light.   Cardiovascular:      Rate and Rhythm: Normal rate and regular rhythm.      Heart sounds: Normal heart sounds. No murmur heard.     No friction rub. No gallop.   Pulmonary:      Effort: Pulmonary effort is normal.      Breath sounds: Normal breath sounds.   Musculoskeletal:         General: Normal range of motion.      Cervical back: Normal range of motion and neck supple.   Skin:     General: Skin is warm and dry.      Coloration: Skin is not jaundiced or pale.      Findings: No rash.   Neurological:      General: No focal deficit present.      Mental Status: She is alert and oriented to person, place, and time. Mental status is at baseline.   Psychiatric:         Mood and Affect: Mood normal.         Behavior: Behavior normal.         Thought Content: Thought content normal.         Judgment: Judgment normal.          Labs Reviewed:  Chemistry:  Lab Results   Component Value Date     05/21/2024    K 4.6 05/21/2024    BUN 15 05/21/2024    CREATININE 0.70 05/21/2024    EGFRNORACEVR >90 05/21/2024    GLUCOSE 95 05/21/2024    CALCIUM 9.6 05/21/2024    ALKPHOS 66 05/21/2024    LABPROT 7.0 05/21/2024    ALBUMIN 4.4 05/21/2024    AST 24 05/21/2024    ALT 21 05/21/2024    [id number] 34 05/21/2024    TSH 1.570 05/21/2024        Lab Results   Component Value Date    HGBA1C 5.7 05/21/2024        Hematology:  Lab Results   Component Value Date    WBC 8.76 05/21/2024    RBC 4.69 05/21/2024    HGB 14.8 05/21/2024    HCT 43.3 05/21/2024    MCV 92.3 05/21/2024    MCH 31.6 05/21/2024    MCHC 34.2 05/21/2024    RDW 13.3 05/21/2024     05/21/2024    MPV 12.5 (H) 05/21/2024       Lipid Panel:  Lab Results   Component Value Date    CHOL 128 05/21/2024    HDL 50 05/21/2024    TRIG 100 05/21/2024        No results found for this or any previous visit  (from the past 24 hours).    Assessment & Plan:  1. Psychophysiological insomnia  Controlled.   Continue Ambien 10mg po qhs.     2. Primary hypertension  Well controlled.  132/82  Continue lisinopril 10mg po daily.   Low Sodium Diet (DASH Diet - Less than 2 grams of sodium per day).  Monitor blood pressure daily and log. Report consistent numbers greater than 140/90.  Maintain healthy weight with goal BMI <30. Exercise 30 minutes per day, 5 days per week.  Smoking cessation encouraged to aid in BP reduction.       Follow up in about 3 months (around 2/21/2025) for 3 month med eval/CS, BP Check.   Return to the clinic as needed.    Future Appointments   Date Time Provider Department Center   2/20/2025  3:00 PM Kalie Little NP [facility]       Lab Frequency Next Occurrence   Ambulatory referral/consult to ENT Once 02/13/2024   CT Chest Lung Screening Low Dose Once 04/19/2024   DXA Bone Density Axial Skeleton 1 or more sites Once 05/23/2024        Kalie Little, JENNIFERP-C

## 2025-01-29 ENCOUNTER — TELEPHONE (OUTPATIENT)
Dept: FAMILY MEDICINE | Facility: CLINIC | Age: 70
End: 2025-01-29
Payer: MEDICARE

## 2025-01-29 DIAGNOSIS — E78.00 HYPERCHOLESTEROLEMIA: Primary | ICD-10-CM

## 2025-01-29 RX ORDER — ROSUVASTATIN CALCIUM 10 MG/1
10 TABLET, COATED ORAL DAILY
Qty: 90 TABLET | Refills: 0 | Status: SHIPPED | OUTPATIENT
Start: 2025-01-29 | End: 2025-04-29

## 2025-02-20 ENCOUNTER — OFFICE VISIT (OUTPATIENT)
Dept: FAMILY MEDICINE | Facility: CLINIC | Age: 70
End: 2025-02-20
Payer: MEDICARE

## 2025-02-20 VITALS
HEART RATE: 77 BPM | WEIGHT: 151.81 LBS | SYSTOLIC BLOOD PRESSURE: 134 MMHG | TEMPERATURE: 97 F | BODY MASS INDEX: 23.83 KG/M2 | OXYGEN SATURATION: 97 % | HEIGHT: 67 IN | DIASTOLIC BLOOD PRESSURE: 84 MMHG

## 2025-02-20 DIAGNOSIS — J44.9 CHRONIC OBSTRUCTIVE PULMONARY DISEASE, UNSPECIFIED COPD TYPE: ICD-10-CM

## 2025-02-20 DIAGNOSIS — F17.200 TOBACCO DEPENDENCE: ICD-10-CM

## 2025-02-20 DIAGNOSIS — M25.562 CHRONIC PAIN OF BOTH KNEES: ICD-10-CM

## 2025-02-20 DIAGNOSIS — F51.04 PSYCHOPHYSIOLOGICAL INSOMNIA: Primary | ICD-10-CM

## 2025-02-20 DIAGNOSIS — G89.29 CHRONIC PAIN OF BOTH KNEES: ICD-10-CM

## 2025-02-20 DIAGNOSIS — M25.561 CHRONIC PAIN OF BOTH KNEES: ICD-10-CM

## 2025-02-20 RX ORDER — BUDESONIDE AND FORMOTEROL FUMARATE DIHYDRATE 160; 4.5 UG/1; UG/1
2 AEROSOL RESPIRATORY (INHALATION) EVERY 12 HOURS
Qty: 10.2 G | Refills: 0 | Status: SHIPPED | OUTPATIENT
Start: 2025-02-20 | End: 2026-02-20

## 2025-02-20 RX ORDER — ZOLPIDEM TARTRATE 10 MG/1
10 TABLET ORAL NIGHTLY
Qty: 30 TABLET | Refills: 2 | Status: SHIPPED | OUTPATIENT
Start: 2025-02-20

## 2025-02-20 RX ORDER — MELOXICAM 7.5 MG/1
7.5 TABLET ORAL DAILY PRN
Qty: 30 TABLET | Refills: 0 | Status: SHIPPED | OUTPATIENT
Start: 2025-02-20

## 2025-02-20 NOTE — PROGRESS NOTES
Patient ID: Brandie Lobato  : 1955    Chief Complaint: Follow-up (3 month medication follow up. ), Hypertension (BP check ), and Medication Refill (Needs 90 days on all rx except sleep meds)    Allergies: Patient has no known allergies.     History of Present Illness    Patient presents today for follow up 3 month med eval.     ENT:  She found dislodged and in ear canal and self-removed bilateral ear tubes (one blue, one white). She denies pain post-removal and reports normal hearing. She will seek evaluation if hearing becomes impaired.    SLEEP:  She requests Ambien refill. She denies prior trials of alternative sleep medications. She denies side effects of sleepwalking or nocturnal amnesia while taking Ambien.    GI:  She has a history of cholecystectomy and experiences frequent urgent bowel movements occurring within 15 minutes post-prandial.          Social History:  reports that she has been smoking cigarettes. She has never used smokeless tobacco. She reports that she does not currently use alcohol. She reports that she does not use drugs.    Past Medical History:  has a past medical history of Heart murmur, Impaired fasting blood sugar, Insomnia, Mitral regurgitation, and Vitamin D deficiency.    Surgical History:   Past Surgical History:   Procedure Laterality Date    ADENOIDECTOMY      CHOLECYSTECTOMY      HYSTERECTOMY         Current Medications:  Current Outpatient Medications   Medication Instructions    budesonide-formoterol 160-4.5 mcg (SYMBICORT) 160-4.5 mcg/actuation HFAA 2 puffs, Inhalation, Every 12 hours, Controller    cholecalciferol, vitamin D3, 1,250 mcg (50,000 unit) capsule 50,000 Int'l Units, Weekly    famotidine (PEPCID) 20 mg, Oral, Nightly PRN    levocetirizine (XYZAL) 5 mg, Oral, Nightly    lisinopriL 10 mg, Oral, Daily    meloxicam (MOBIC) 7.5 mg, Oral, Daily PRN    methocarbamoL (ROBAXIN) 500 mg, Every 8 hours    rosuvastatin (CRESTOR) 10 mg, Oral, Daily    zolpidem (AMBIEN)  "10 mg, Oral, Nightly       Review of Systems   A comprehensive review of symptoms was completed and negative except as noted above.    Visit Vitals  /84 (Patient Position: Sitting)   Pulse 77   Temp 97.1 °F (36.2 °C)   Ht 5' 7" (1.702 m)   Wt 68.9 kg (151 lb 12.8 oz)   SpO2 97%   BMI 23.78 kg/m²       Physical Exam  Vitals and nursing note reviewed.   Constitutional:       General: She is not in acute distress.     Appearance: Normal appearance. She is not toxic-appearing.   HENT:      Head: Normocephalic and atraumatic.      Right Ear: Tympanic membrane, ear canal and external ear normal.      Left Ear: Tympanic membrane, ear canal and external ear normal.      Nose: Nose normal.      Mouth/Throat:      Mouth: Mucous membranes are moist.      Pharynx: Oropharynx is clear.   Eyes:      Extraocular Movements: Extraocular movements intact.      Conjunctiva/sclera: Conjunctivae normal.      Pupils: Pupils are equal, round, and reactive to light.   Cardiovascular:      Rate and Rhythm: Normal rate and regular rhythm.      Heart sounds: Normal heart sounds. No murmur heard.     No friction rub. No gallop.   Pulmonary:      Effort: Pulmonary effort is normal.      Breath sounds: Wheezing present.   Musculoskeletal:         General: Normal range of motion.      Cervical back: Normal range of motion and neck supple.   Skin:     General: Skin is warm and dry.      Coloration: Skin is not jaundiced or pale.      Findings: No rash.   Neurological:      General: No focal deficit present.      Mental Status: She is alert and oriented to person, place, and time. Mental status is at baseline.   Psychiatric:         Mood and Affect: Mood normal.         Behavior: Behavior normal.         Thought Content: Thought content normal.         Judgment: Judgment normal.          Labs Reviewed:  Chemistry:  Lab Results   Component Value Date     05/21/2024    K 4.6 05/21/2024    BUN 15 05/21/2024    CREATININE 0.70 05/21/2024    " EGFRNORACEVR >90 05/21/2024    GLUCOSE 95 05/21/2024    CALCIUM 9.6 05/21/2024    ALKPHOS 66 05/21/2024    LABPROT 7.0 05/21/2024    ALBUMIN 4.4 05/21/2024    AST 24 05/21/2024    ALT 21 05/21/2024    [id number] 34 05/21/2024    TSH 1.570 05/21/2024        Lab Results   Component Value Date    HGBA1C 5.7 05/21/2024        Hematology:  Lab Results   Component Value Date    WBC 8.76 05/21/2024    RBC 4.69 05/21/2024    HGB 14.8 05/21/2024    HCT 43.3 05/21/2024    MCV 92.3 05/21/2024    MCH 31.6 05/21/2024    MCHC 34.2 05/21/2024    RDW 13.3 05/21/2024     05/21/2024    MPV 12.5 (H) 05/21/2024       Lipid Panel:  Lab Results   Component Value Date    CHOL 128 05/21/2024    HDL 50 05/21/2024    TRIG 100 05/21/2024        No results found for this or any previous visit (from the past 24 hours).    Assessment & Plan:  1. Psychophysiological insomnia  -     zolpidem (AMBIEN) 10 mg Tab; Take 1 tablet (10 mg total) by mouth every evening.  Dispense: 30 tablet; Refill: 2    2. Chronic pain of both knees  -     meloxicam (MOBIC) 7.5 MG tablet; Take 1 tablet (7.5 mg total) by mouth daily as needed for Pain.  Dispense: 30 tablet; Refill: 0    3. Chronic obstructive pulmonary disease, unspecified COPD type  -     budesonide-formoterol 160-4.5 mcg (SYMBICORT) 160-4.5 mcg/actuation HFAA; Inhale 2 puffs into the lungs every 12 (twelve) hours. Controller  Dispense: 10.2 g; Refill: 0    4. Tobacco dependence  Smoking cessation       Assessment & Plan    IMPRESSION:  - Reviewed current medications, including Ambien for sleep  - Considered alternative sleep aids such as melatonin before continuing Ambien  - Assessed potential side effects of Ambien, including sleepwalking and amnesia  - Evaluated need for inhaler, prescribed Symbicort for respiratory symptoms  - Noted history of gallbladder removal and its impact on daily activities    CHRONIC OBSTRUCTIVE PULMONARY DISEASE WITH (ACUTE) EXACERBATION:  - Prescribed Symbicort  inhaler, 2 puffs every 12 hours for respiratory symptoms.  - Instructed patient to rinse mouth with water after use.  - Advised to contact office if Symbicort is effective for potential refill or if insurance denies prescription for prior authorization.  - Evaluated patient's history with inhalers.  - Noted patient's difficulty hearing when fluid builds up.    INSOMNIA:  - Continued Ambien prescription, limited to 30-day supply as per pharmacy policy.  - Inquired about alternative sleep aids like melatonin.  - Assessed for potential side effects such as sleepwalking.        FOLLOW UP:  - Schedule follow up in about 3 months for annual labs and Medicare wellness visit.          Follow up in about 3 months (around 6/2/2025) for Medicare Wellness, Fasting Labs Prior, 3 month med eval/CS.   Return to the clinic as needed.    Future Appointments   Date Time Provider Department Center   6/17/2025  8:30 AM NURSE, [facility] ADAIR Mckenzei   6/19/2025  3:00 PM Kalie Little NP [facility]       Lab Frequency Next Occurrence   Ambulatory referral/consult to ENT Once 02/13/2024   CT Chest Lung Screening Low Dose Once 04/19/2024   DXA Bone Density Axial Skeleton 1 or more sites Once 05/23/2024        This note was generated with the assistance of ambient listening technology. Verbal consent was obtained by the patient and accompanying visitor(s) for the recording of patient appointment to facilitate this note. I attest to having reviewed and edited the generated note for accuracy, though some syntax or spelling errors may persist. Please contact the author of this note for any clarification.          SOLEDAD Lima

## 2025-02-20 NOTE — LETTER
[facility]  [address] [address]  Phone: [phone] February 20, 2025    Brandie Lobato  [address] [address] [address]      To Whom It May Concern:    Brandie Lobato is unable to participate in jury duty due to medical reasons.    If you have any questions or concerns, please feel free to call my office.    Sincerely,            Kalie Little, NP

## 2025-02-28 DIAGNOSIS — Z87.891 PERSONAL HISTORY OF TOBACCO USE, PRESENTING HAZARDS TO HEALTH: Primary | ICD-10-CM

## 2025-03-27 ENCOUNTER — HOSPITAL ENCOUNTER (OUTPATIENT)
Dept: RADIOLOGY | Facility: HOSPITAL | Age: 70
Discharge: HOME | End: 2025-03-27
Attending: INTERNAL MEDICINE
Payer: MEDICARE

## 2025-03-27 DIAGNOSIS — Z87.891 PERSONAL HISTORY OF TOBACCO USE, PRESENTING HAZARDS TO HEALTH: ICD-10-CM

## 2025-03-27 PROCEDURE — 71271 CT THORAX LUNG CANCER SCR C-: CPT | Mod: TC

## 2025-04-08 ENCOUNTER — OFFICE VISIT (OUTPATIENT)
Dept: FAMILY MEDICINE | Facility: CLINIC | Age: 70
End: 2025-04-08
Payer: MEDICARE

## 2025-04-08 VITALS
SYSTOLIC BLOOD PRESSURE: 152 MMHG | OXYGEN SATURATION: 96 % | BODY MASS INDEX: 23.33 KG/M2 | WEIGHT: 148.63 LBS | HEART RATE: 82 BPM | HEIGHT: 67 IN | DIASTOLIC BLOOD PRESSURE: 74 MMHG | TEMPERATURE: 97 F

## 2025-04-08 DIAGNOSIS — J30.89 ENVIRONMENTAL AND SEASONAL ALLERGIES: ICD-10-CM

## 2025-04-08 DIAGNOSIS — Z71.2 PERSON CONSULTING FOR EXPLANATION OF EXAMINATION OR TEST FINDING: ICD-10-CM

## 2025-04-08 DIAGNOSIS — H92.02 OTALGIA, LEFT: Primary | ICD-10-CM

## 2025-04-08 PROCEDURE — 99214 OFFICE O/P EST MOD 30 MIN: CPT | Mod: ,,, | Performed by: NURSE PRACTITIONER

## 2025-04-08 PROCEDURE — G2211 COMPLEX E/M VISIT ADD ON: HCPCS | Mod: ,,, | Performed by: NURSE PRACTITIONER

## 2025-04-08 RX ORDER — PREDNISONE 20 MG/1
20 TABLET ORAL DAILY
Qty: 3 TABLET | Refills: 0 | Status: SHIPPED | OUTPATIENT
Start: 2025-04-08 | End: 2025-04-11

## 2025-04-08 RX ORDER — PSEUDOEPHEDRINE HCL 120 MG/1
120 TABLET, FILM COATED, EXTENDED RELEASE ORAL 2 TIMES DAILY
Qty: 20 TABLET | Refills: 0 | Status: SHIPPED | OUTPATIENT
Start: 2025-04-08 | End: 2025-04-18

## 2025-04-09 NOTE — PROGRESS NOTES
Patient ID: Brandie Lobato  : 1955    Chief Complaint: Otalgia    Allergies: Patient has no known allergies.     History of Present Illness    Patient presents today with ear pain and sinus problems.    ENT AND ALLERGY:  She reports sharp ear pain. She currently takes Zyrtec and Claritin for allergies, with past use of Singulair. She denies fever, chills, and sore throat.    IMAGING:  CT showed emphysema, granulomas, moderate degenerative changes, and moderate atherosclerotic plaquing throughout the thoracic aorta.    MEDICATIONS:  She takes Crestor and Aspirin prescribed by cardiology. She has Flomax at home. Her supplements include vitamins D3, C, E, and K2.      ROS:  General: -fever, -chills, -fatigue, -weight gain, -weight loss  Eyes: -vision changes, -redness, -discharge  ENT: +ear pain, +nasal congestion, -sore throat, +shooting pain sensation  Cardiovascular: -chest pain, -palpitations, -lower extremity edema  Respiratory: -cough, -shortness of breath  Gastrointestinal: -abdominal pain, -nausea, -vomiting, -diarrhea, -constipation, -blood in stool  Genitourinary: -dysuria, -hematuria, -frequency  Musculoskeletal: -joint pain, -muscle pain, +back pain  Skin: -rash, -lesion  Neurological: -headache, -dizziness, -numbness, -tingling  Psychiatric: -anxiety, -depression, -sleep difficulty          Social History:  reports that she has been smoking cigarettes. She started smoking about 55 years ago. She has a 55.1 pack-year smoking history. She has never used smokeless tobacco. She reports that she does not currently use alcohol. She reports that she does not use drugs.    Past Medical History:  has a past medical history of Heart murmur, Impaired fasting blood sugar, Insomnia, Mitral regurgitation, and Vitamin D deficiency.    Surgical History:   Past Surgical History:   Procedure Laterality Date    ADENOIDECTOMY      CHOLECYSTECTOMY      HYSTERECTOMY         Current Medications:  Current Outpatient  "Medications   Medication Instructions    budesonide-formoterol 160-4.5 mcg (SYMBICORT) 160-4.5 mcg/actuation HFAA 2 puffs, Inhalation, Every 12 hours, Controller    cholecalciferol, vitamin D3, 1,250 mcg (50,000 unit) capsule 50,000 Int'l Units, Weekly    famotidine (PEPCID) 20 mg, Oral, Nightly PRN    levocetirizine (XYZAL) 5 mg, Oral, Nightly    lisinopriL 10 mg, Oral, Daily    meloxicam (MOBIC) 7.5 mg, Oral, Daily PRN    methocarbamoL (ROBAXIN) 500 mg, Every 8 hours    predniSONE (DELTASONE) 20 mg, Oral, Daily    pseudoephedrine (SUDAFED 12 HOUR) 120 mg, Oral, 2 times daily    rosuvastatin (CRESTOR) 10 mg, Oral, Daily    zolpidem (AMBIEN) 10 mg, Oral, Nightly       Review of Systems   A comprehensive review of symptoms was completed and negative except as noted above.    Visit Vitals  BP (!) 152/74 (BP Location: Left arm, Patient Position: Sitting)   Pulse 82   Temp 97.4 °F (36.3 °C) (Temporal)   Ht 5' 7.01" (1.702 m)   Wt 67.4 kg (148 lb 9.6 oz)   SpO2 96%   BMI 23.27 kg/m²       Physical Exam  Vitals and nursing note reviewed.   Constitutional:       General: She is not in acute distress.     Appearance: Normal appearance. She is not toxic-appearing.   HENT:      Head: Normocephalic and atraumatic.      Right Ear: Tympanic membrane, ear canal and external ear normal.      Left Ear: Tympanic membrane, ear canal and external ear normal.      Nose: Congestion and rhinorrhea present.      Mouth/Throat:      Mouth: Mucous membranes are moist.      Pharynx: Oropharynx is clear.   Eyes:      Extraocular Movements: Extraocular movements intact.      Conjunctiva/sclera: Conjunctivae normal.      Pupils: Pupils are equal, round, and reactive to light.   Cardiovascular:      Rate and Rhythm: Normal rate and regular rhythm.      Heart sounds: Normal heart sounds. No murmur heard.     No friction rub. No gallop.   Pulmonary:      Effort: Pulmonary effort is normal.      Breath sounds: Normal breath sounds. "   Musculoskeletal:         General: Normal range of motion.      Cervical back: Normal range of motion and neck supple.   Lymphadenopathy:      Cervical: No cervical adenopathy.   Skin:     General: Skin is warm and dry.      Coloration: Skin is not jaundiced or pale.      Findings: No rash.   Neurological:      General: No focal deficit present.      Mental Status: She is alert and oriented to person, place, and time. Mental status is at baseline.   Psychiatric:         Mood and Affect: Mood normal.         Behavior: Behavior normal.         Thought Content: Thought content normal.         Judgment: Judgment normal.        Labs Reviewed:  Chemistry:  Lab Results   Component Value Date     05/21/2024    K 4.6 05/21/2024    BUN 15 05/21/2024    CREATININE 0.70 05/21/2024    EGFRNORACEVR >90 05/21/2024    GLUCOSE 95 05/21/2024    CALCIUM 9.6 05/21/2024    ALKPHOS 66 05/21/2024    LABPROT 7.0 05/21/2024    ALBUMIN 4.4 05/21/2024    AST 24 05/21/2024    ALT 21 05/21/2024    [id number] 34 05/21/2024    TSH 1.570 05/21/2024        Lab Results   Component Value Date    HGBA1C 5.7 05/21/2024        Hematology:  Lab Results   Component Value Date    WBC 8.76 05/21/2024    RBC 4.69 05/21/2024    HGB 14.8 05/21/2024    HCT 43.3 05/21/2024    MCV 92.3 05/21/2024    MCH 31.6 05/21/2024    MCHC 34.2 05/21/2024    RDW 13.3 05/21/2024     05/21/2024    MPV 12.5 (H) 05/21/2024       Lipid Panel:  Lab Results   Component Value Date    CHOL 128 05/21/2024    HDL 50 05/21/2024    TRIG 100 05/21/2024        No results found for this or any previous visit (from the past 24 hours).    Assessment & Plan:  1. Otalgia, left  -     pseudoephedrine (SUDAFED 12 HOUR) 120 mg 12 hr tablet; Take 1 tablet (120 mg total) by mouth 2 (two) times daily. for 10 days  Dispense: 20 tablet; Refill: 0  -     predniSONE (DELTASONE) 20 MG tablet; Take 1 tablet (20 mg total) by mouth once daily. for 3 days  Dispense: 3 tablet; Refill: 0    2.  Environmental and seasonal allergies    3. Person consulting for explanation of examination or test finding     - Discussed the nature of atherosclerotic plaquing in arteries and its relation to cholesterol management.  - Noted moderate atherosclerotic plaquing in the thoracic aorta from CT results.  - Explained the presence of fat cholesterol buildup in arteries to the patient.  - Ordered cholesterol check for next month to assess current levels.  - Continued current management with Crestor and Aspirin.  - Confirmed the presence of emphysema based on CT results.  - Explained the implications of the emphysema diagnosis to the patient.  - Advised the patient on management strategies for emphysema.        Assessment & Plan    IMPRESSION:  - Assessed ear discomfort, noting no signs of infection but possible sinus-related pressure.  - Initiated decongestants and considered short-term steroid use for congestion and inflammation.  - Reviewed CT Chest results, noting emphysema, granulomas (likely from previous pneumonia), moderate degenerative changes in the back, and moderate atherosclerotic plaquing in the thoracic aorta.  - Considered need for cholesterol check given current Crestor regimen.  - Assessed BP, noting potential need for medication adjustment.    ALLERGIES:  - Noted swelling in the nasal area during exam.  - Assessed the effectiveness of current allergy medications.  - Prescribed Sudafed (pseudoephedrine) 12-hour, prescription strength, for 10 days to address congestion.  - Instructed the patient to drink plenty of fluids while taking decongestants.  - Considered prescribing steroids for inflammation if needed.  - Advised the patient to contact the office if no relief from decongestants by Thursday for potential antibiotic prescription.  - Noted swelling in the sinus area during exam.  - Prescribed Sudafed (pseudoephedrine) 12-hour, prescription strength, for 10 days to address congestion.  - Instructed the  patient to drink plenty of fluids while taking decongestants.  - Recommend follow-up with an ENT specialist for further evaluation and management.  - Advised the patient to contact the office if no relief from decongestants by Thursday for potential antibiotic prescription.    OTALGIA (EAR PAIN):  - Examined ears and found no signs of infection.  - Considered possible causes of ear pain, including sinus pressure.  - Recommend decongestants to alleviate potential sinus-related ear pain.  - Considered prescribing low-dose steroids if symptoms persist.  - Advised follow-up if ear pain continues or worsens.    ABNORMAL FINDING (GRANULOMAS):  - Identified granulomas on CT.  - Explained the possible causes of granulomas to the patient.  - Advised further monitoring of the granulomas in future follow-ups.    OTHER:  - Follow up in 1 week for BP check.          Follow up in about 1 week (around 4/15/2025) for BP Check.   Return to the clinic as needed.  ENT appt pending in [place] 5/2025.    Future Appointments   Date Time Provider Department Center   4/16/2025  9:00 AM Kalie Little NP [facility]   6/17/2025  8:30 AM NURSE, [facility] FAMILY MEDICINE [facility]   6/19/2025  3:00 PM Kalie Little NP [facility]       Lab Frequency Next Occurrence   DXA Bone Density Axial Skeleton 1 or more sites Once 05/23/2024        This note was generated with the assistance of ambient listening technology. Verbal consent was obtained by the patient and accompanying visitor(s) for the recording of patient appointment to facilitate this note. I attest to having reviewed and edited the generated note for accuracy, though some syntax or spelling errors may persist. Please contact the author of this note for any clarification.          SOLEDAD Lima

## 2025-04-16 ENCOUNTER — OFFICE VISIT (OUTPATIENT)
Dept: FAMILY MEDICINE | Facility: CLINIC | Age: 70
End: 2025-04-16
Payer: MEDICARE

## 2025-04-16 VITALS
SYSTOLIC BLOOD PRESSURE: 124 MMHG | WEIGHT: 150.63 LBS | TEMPERATURE: 97 F | BODY MASS INDEX: 23.64 KG/M2 | OXYGEN SATURATION: 96 % | DIASTOLIC BLOOD PRESSURE: 76 MMHG | HEIGHT: 67 IN | HEART RATE: 77 BPM

## 2025-04-16 DIAGNOSIS — E78.00 HYPERCHOLESTEROLEMIA: ICD-10-CM

## 2025-04-16 DIAGNOSIS — I10 PRIMARY HYPERTENSION: Primary | ICD-10-CM

## 2025-04-16 PROBLEM — K13.21 LEUKOPLAKIA OF ORAL MUCOSA: Status: RESOLVED | Noted: 2023-03-28 | Resolved: 2025-04-16

## 2025-04-16 PROCEDURE — 99213 OFFICE O/P EST LOW 20 MIN: CPT | Mod: ,,, | Performed by: NURSE PRACTITIONER

## 2025-04-16 NOTE — PROGRESS NOTES
Patient ID: Brandie Lobato  : 1955    Chief Complaint: Blood Pressure Check    Allergies: Patient has no known allergies.     History of Present Illness    Patient presents today for follow up blood pressure check.     ENT:  She reports resolution of ear pain. The holes in her ears have closed, allowing her to shower without concern for water exposure.    MEDICATIONS:  She takes half a Zyrtec tablet daily and reports Tylenol is effective without causing sedation. She reports being low on all medications at the pharmacy.      ROS:  General: -fever, -chills, -fatigue, -weight gain, -weight loss, +feeling of increased warmth  Eyes: -vision changes, -redness, -discharge  ENT: -ear pain, -nasal congestion, -sore throat  Cardiovascular: -chest pain, -palpitations, -lower extremity edema  Respiratory: -cough, -shortness of breath  Gastrointestinal: -abdominal pain, -nausea, -vomiting, -diarrhea, -constipation, -blood in stool  Genitourinary: -dysuria, -hematuria, -frequency  Musculoskeletal: -joint pain, -muscle pain  Skin: -rash, -lesion  Neurological: -headache, -dizziness, -numbness, -tingling  Psychiatric: -anxiety, -depression, -sleep difficulty          Social History:  reports that she has been smoking cigarettes. She started smoking about 55 years ago. She has a 55.1 pack-year smoking history. She has never used smokeless tobacco. She reports that she does not currently use alcohol. She reports that she does not use drugs.    Past Medical History:  has a past medical history of Heart murmur, Insomnia, Mitral regurgitation, and Vitamin D deficiency.    Surgical History:   Past Surgical History:   Procedure Laterality Date    ADENOIDECTOMY      CHOLECYSTECTOMY      HYSTERECTOMY         Current Medications:  Current Outpatient Medications   Medication Instructions    budesonide-formoterol 160-4.5 mcg (SYMBICORT) 160-4.5 mcg/actuation HFAA 2 puffs, Inhalation, Every 12 hours, Controller    cholecalciferol,  "vitamin D3, 1,250 mcg (50,000 unit) capsule 50,000 Int'l Units, Weekly    famotidine (PEPCID) 20 mg, Oral, Nightly PRN    levocetirizine (XYZAL) 5 mg, Oral, Nightly    lisinopriL 10 mg, Oral, Daily    meloxicam (MOBIC) 7.5 mg, Oral, Daily PRN    rosuvastatin (CRESTOR) 10 mg, Oral, Daily    zolpidem (AMBIEN) 10 mg, Oral, Nightly       Review of Systems   A comprehensive review of symptoms was completed and negative except as noted above.    Visit Vitals  /76 (BP Location: Right arm, Patient Position: Sitting)   Pulse 77   Temp 97.1 °F (36.2 °C) (Temporal)   Ht 5' 7.01" (1.702 m)   Wt 68.3 kg (150 lb 9.6 oz)   SpO2 96%   BMI 23.58 kg/m²       Physical Exam  Vitals and nursing note reviewed.   Constitutional:       General: She is not in acute distress.     Appearance: Normal appearance. She is not toxic-appearing.   HENT:      Head: Normocephalic and atraumatic.      Right Ear: Tympanic membrane, ear canal and external ear normal.      Left Ear: Tympanic membrane, ear canal and external ear normal.      Nose: Nose normal.      Mouth/Throat:      Mouth: Mucous membranes are moist.      Pharynx: Oropharynx is clear.   Eyes:      Extraocular Movements: Extraocular movements intact.      Conjunctiva/sclera: Conjunctivae normal.      Pupils: Pupils are equal, round, and reactive to light.   Cardiovascular:      Rate and Rhythm: Normal rate and regular rhythm.      Heart sounds: Normal heart sounds. No murmur heard.     No friction rub. No gallop.   Pulmonary:      Effort: Pulmonary effort is normal.      Breath sounds: Normal breath sounds.   Musculoskeletal:         General: Normal range of motion.      Cervical back: Normal range of motion and neck supple.   Skin:     General: Skin is warm and dry.      Coloration: Skin is not jaundiced or pale.      Findings: No rash.   Neurological:      General: No focal deficit present.      Mental Status: She is alert and oriented to person, place, and time. Mental status is " at baseline.   Psychiatric:         Mood and Affect: Mood normal.         Behavior: Behavior normal.         Thought Content: Thought content normal.         Judgment: Judgment normal.          Labs Reviewed:  Chemistry:  Lab Results   Component Value Date     05/21/2024    K 4.6 05/21/2024    BUN 15 05/21/2024    CREATININE 0.70 05/21/2024    EGFRNORACEVR >90 05/21/2024    GLUCOSE 95 05/21/2024    CALCIUM 9.6 05/21/2024    ALKPHOS 66 05/21/2024    LABPROT 7.0 05/21/2024    ALBUMIN 4.4 05/21/2024    AST 24 05/21/2024    ALT 21 05/21/2024    [id number] 34 05/21/2024    TSH 1.570 05/21/2024        Lab Results   Component Value Date    HGBA1C 5.7 05/21/2024        Hematology:  Lab Results   Component Value Date    WBC 8.76 05/21/2024    RBC 4.69 05/21/2024    HGB 14.8 05/21/2024    HCT 43.3 05/21/2024    MCV 92.3 05/21/2024    MCH 31.6 05/21/2024    MCHC 34.2 05/21/2024    RDW 13.3 05/21/2024     05/21/2024    MPV 12.5 (H) 05/21/2024       Lipid Panel:  Lab Results   Component Value Date    CHOL 128 05/21/2024    HDL 50 05/21/2024    TRIG 100 05/21/2024        No results found for this or any previous visit (from the past 24 hours).    Assessment & Plan:  1. Primary hypertension  Well controlled. /76   Continue lisinopril 10mg po daily.   Low Sodium Diet (DASH Diet - Less than 2 grams of sodium per day).  Monitor blood pressure daily and log. Report consistent numbers greater than 140/90.  Maintain healthy weight with goal BMI <30. Exercise 30 minutes per day, 5 days per week.  Smoking cessation encouraged to aid in BP reduction.      2. Hypercholesterolemia  Lab Results   Component Value Date    TRIG 100 05/21/2024    HDL 50 05/21/2024   Educated on dietary modifications. Follow a low cholesterol, low saturated fat diet with less that 200mg of cholesterol a day.  Avoid fried foods and high saturated fats.  Increase dietary fiber.  Regular exercise can reduce LDL (bad cholesterol) and raise HDL  (good cholesterol). Encourage physical activity 5 times per week for 30 minutes per day.     - refill rosuvastatin (CRESTOR) 10 MG tablet; Take 1 tablet (10 mg total) by mouth Daily.  Dispense: 90 tablet; Refill: 0        Follow up in about 3 months (around 7/16/2025) for 3 month med eval/CS, BP Check.   Return to the clinic as needed.    Future Appointments   Date Time Provider Department Center   6/17/2025  8:30 AM NURSE, [facility] FAMILY MEDICINE [facility]   6/19/2025  3:00 PM Kalie Little NP [facility]   7/16/2025 10:00 AM Kalie Little NP [facility]       Lab Frequency Next Occurrence   DXA Bone Density Axial Skeleton 1 or more sites Once 05/23/2024        This note was generated with the assistance of ambient listening technology. Verbal consent was obtained by the patient and accompanying visitor(s) for the recording of patient appointment to facilitate this note. I attest to having reviewed and edited the generated note for accuracy, though some syntax or spelling errors may persist. Please contact the author of this note for any clarification.          SOLEDAD Lima

## 2025-04-20 PROBLEM — K21.9 GASTROESOPHAGEAL REFLUX DISEASE WITHOUT ESOPHAGITIS: Status: ACTIVE | Noted: 2025-04-20

## 2025-04-20 RX ORDER — ROSUVASTATIN CALCIUM 10 MG/1
10 TABLET, COATED ORAL DAILY
Qty: 90 TABLET | Refills: 0 | Status: SHIPPED | OUTPATIENT
Start: 2025-04-20 | End: 2025-07-19

## 2025-05-01 ENCOUNTER — HOSPITAL ENCOUNTER (OUTPATIENT)
Dept: RADIOLOGY | Facility: HOSPITAL | Age: 70
Discharge: HOME | End: 2025-05-01
Attending: NURSE PRACTITIONER
Payer: MEDICARE

## 2025-05-01 DIAGNOSIS — M85.852 OSTEOPENIA OF NECK OF LEFT FEMUR: ICD-10-CM

## 2025-05-01 PROCEDURE — 77080 DXA BONE DENSITY AXIAL: CPT | Mod: TC

## 2025-05-20 ENCOUNTER — TELEPHONE (OUTPATIENT)
Dept: FAMILY MEDICINE | Facility: CLINIC | Age: 70
End: 2025-05-20
Payer: MEDICARE

## 2025-05-20 DIAGNOSIS — G89.29 CHRONIC PAIN OF BOTH KNEES: ICD-10-CM

## 2025-05-20 DIAGNOSIS — M25.561 CHRONIC PAIN OF BOTH KNEES: ICD-10-CM

## 2025-05-20 DIAGNOSIS — M25.562 CHRONIC PAIN OF BOTH KNEES: ICD-10-CM

## 2025-05-20 DIAGNOSIS — I10 PRIMARY HYPERTENSION: ICD-10-CM

## 2025-05-20 RX ORDER — LISINOPRIL 10 MG/1
10 TABLET ORAL DAILY
Qty: 90 TABLET | Refills: 0 | Status: SHIPPED | OUTPATIENT
Start: 2025-05-20

## 2025-05-20 RX ORDER — MELOXICAM 7.5 MG/1
7.5 TABLET ORAL DAILY PRN
Qty: 30 TABLET | Refills: 0 | Status: SHIPPED | OUTPATIENT
Start: 2025-05-20

## 2025-05-20 NOTE — TELEPHONE ENCOUNTER
Refill request for Lisinopril to be sent to [facility] completed  Last appt: 4/16/25  Next appt: 7/16/25

## 2025-06-03 NOTE — ED PROVIDER NOTES
06/03/25                            Martir Diehl  [address] [place] [id number]    To Whom It May Concern:    This is to certify Martir Diehl was evaluated with Jack Peterson DPM on 06/03/25 and can return to school on 06/03/25.     RESTRICTIONS: NONE      Electronically signed by:  Jack Peterson DPM  [facility] [address] [address]  Dept Phone: [phone]        Encounter Date: 7/7/2023       History     Chief Complaint   Patient presents with    Hip Pain     Left side     Presents to the emergency room with chronic low back and left hip pain.  The pain starts in her low back and radiates to the left leg.  It is just been getting bad recently. She is on hydrocodone and gabapentin, and it is not getting any better.    The history is provided by the patient.   Review of patient's allergies indicates:  No Known Allergies  Past Medical History:   Diagnosis Date    Fibrocystic breast     Heart murmur     Hypercholesteremia     Hypertension     Impaired fasting blood sugar     Insomnia     Mitral regurgitation     Vitamin D deficiency      Past Surgical History:   Procedure Laterality Date    ADENOIDECTOMY      CHOLECYSTECTOMY      HYSTERECTOMY       History reviewed. No pertinent family history.  Social History     Tobacco Use    Smoking status: Every Day     Packs/day: 1.00     Types: Cigarettes    Smokeless tobacco: Never   Substance Use Topics    Alcohol use: Not Currently    Drug use: Never     Review of Systems   Constitutional:  Negative for fever.   HENT:  Negative for sore throat.    Respiratory:  Negative for shortness of breath.    Cardiovascular:  Negative for chest pain.   Gastrointestinal:  Negative for nausea.   Genitourinary:  Negative for dysuria, pelvic pain and urgency.   Musculoskeletal:  Positive for back pain.   Skin:  Negative for rash.   Neurological:  Negative for weakness.   Hematological:  Does not bruise/bleed easily.   All other systems reviewed and are negative.    Physical Exam     Initial Vitals [07/07/23 0549]   BP Pulse Resp Temp SpO2   (!) 164/89 88 16 -- 95 %      MAP       --         Physical Exam    Nursing note and vitals reviewed.  Constitutional: She appears well-developed and well-nourished.   HENT:   Head: Normocephalic and atraumatic.   Eyes: EOM are normal. Pupils are equal, round, and reactive to light.   Neck: Neck supple.   Normal  range of motion.  Cardiovascular:  Normal rate, regular rhythm and normal heart sounds.           Pulmonary/Chest: Breath sounds normal.   Abdominal: Abdomen is soft. Bowel sounds are normal. There is no abdominal tenderness.   Musculoskeletal:         General: No edema. Normal range of motion.      Cervical back: Normal range of motion and neck supple.      Comments: Patient has full range of motion of her left leg and no hip tenderness on palpation    Left paralumbar area is mildly tender.     Neurological: She is alert and oriented to person, place, and time.   Skin: Skin is warm and dry. Capillary refill takes less than 2 seconds.   Psychiatric: She has a normal mood and affect.       ED Course   Procedures  Labs Reviewed - No data to display       Imaging Results    None          Medications   ketorolac injection 60 mg (has no administration in time range)   dexAMETHasone injection 8 mg (has no administration in time range)     Medical Decision Making:   ED Management:  Review of chart shows a recent MRI of her low back showing moderately severe DJD and CT scans of her abdomen and pelvis for this.  Further imaging in the emergency department is not necessary.  I will start a course of some steroids, and advised continuing on her pain medications as previously prescribed.                        Clinical Impression:   Final diagnoses:  [M54.42, G89.29] Chronic left-sided low back pain with left-sided sciatica (Primary)        ED Disposition Condition    Discharge Stable          ED Prescriptions       Medication Sig Dispense Start Date End Date Auth. Provider    dexAMETHasone (DECADRON) 4 MG Tab Take 1 tablet (4 mg total) by mouth once daily. for 5 days 5 tablet 7/7/2023 7/12/2023 Leonard Diaz MD          Follow-up Information       Follow up With Specialties Details Why Contact Info    Kalie Little NP Family Medicine Call  If symptoms worsen [address] [facility] [address]  [phone]               Leonard  MD Joe  07/07/23 0606       Leonard Diaz MD  07/07/23 0607

## 2025-06-17 ENCOUNTER — RESULTS FOLLOW-UP (OUTPATIENT)
Dept: FAMILY MEDICINE | Facility: CLINIC | Age: 70
End: 2025-06-17
Payer: MEDICARE

## 2025-06-17 PROCEDURE — 80053 COMPREHEN METABOLIC PANEL: CPT | Performed by: NURSE PRACTITIONER

## 2025-06-17 PROCEDURE — 83036 HEMOGLOBIN GLYCOSYLATED A1C: CPT | Performed by: NURSE PRACTITIONER

## 2025-06-17 PROCEDURE — 80061 LIPID PANEL: CPT | Performed by: NURSE PRACTITIONER

## 2025-06-17 PROCEDURE — 84443 ASSAY THYROID STIM HORMONE: CPT | Performed by: NURSE PRACTITIONER

## 2025-06-17 PROCEDURE — 82306 VITAMIN D 25 HYDROXY: CPT | Performed by: NURSE PRACTITIONER

## 2025-06-17 PROCEDURE — 85025 COMPLETE CBC W/AUTO DIFF WBC: CPT | Performed by: NURSE PRACTITIONER

## 2025-06-26 ENCOUNTER — OFFICE VISIT (OUTPATIENT)
Dept: FAMILY MEDICINE | Facility: CLINIC | Age: 70
End: 2025-06-26
Payer: MEDICARE

## 2025-06-26 VITALS
WEIGHT: 146.38 LBS | HEIGHT: 67 IN | SYSTOLIC BLOOD PRESSURE: 135 MMHG | TEMPERATURE: 97 F | HEART RATE: 76 BPM | DIASTOLIC BLOOD PRESSURE: 66 MMHG | BODY MASS INDEX: 22.98 KG/M2 | OXYGEN SATURATION: 97 %

## 2025-06-26 DIAGNOSIS — Z12.31 ENCOUNTER FOR SCREENING MAMMOGRAM FOR BREAST CANCER: ICD-10-CM

## 2025-06-26 DIAGNOSIS — F41.9 ANXIETY: ICD-10-CM

## 2025-06-26 DIAGNOSIS — Z00.00 ENCOUNTER FOR PREVENTIVE HEALTH EXAMINATION: ICD-10-CM

## 2025-06-26 DIAGNOSIS — Z12.11 COLON CANCER SCREENING: ICD-10-CM

## 2025-06-26 DIAGNOSIS — F32.A DEPRESSION, UNSPECIFIED DEPRESSION TYPE: ICD-10-CM

## 2025-06-26 DIAGNOSIS — Z71.6 TOBACCO ABUSE COUNSELING: ICD-10-CM

## 2025-06-26 DIAGNOSIS — Z00.00 MEDICARE ANNUAL WELLNESS VISIT, SUBSEQUENT: Primary | ICD-10-CM

## 2025-06-26 DIAGNOSIS — Z23 ENCOUNTER FOR IMMUNIZATION: ICD-10-CM

## 2025-06-26 DIAGNOSIS — Z13.31 POSITIVE SCREENING FOR DEPRESSION ON 9-ITEM PATIENT HEALTH QUESTIONNAIRE (PHQ-9): ICD-10-CM

## 2025-06-26 DIAGNOSIS — F17.200 TOBACCO DEPENDENCE: ICD-10-CM

## 2025-06-26 PROCEDURE — 99213 OFFICE O/P EST LOW 20 MIN: CPT | Mod: 25,,, | Performed by: NURSE PRACTITIONER

## 2025-06-26 PROCEDURE — G0439 PPPS, SUBSEQ VISIT: HCPCS | Mod: ,,, | Performed by: NURSE PRACTITIONER

## 2025-06-26 RX ORDER — ESCITALOPRAM OXALATE 10 MG/1
10 TABLET ORAL DAILY
Qty: 90 TABLET | Refills: 3 | Status: SHIPPED | OUTPATIENT
Start: 2025-06-26

## 2025-06-26 RX ORDER — ESCITALOPRAM OXALATE 10 MG/1
10 TABLET ORAL DAILY
Qty: 90 TABLET | Refills: 3 | Status: SHIPPED | OUTPATIENT
Start: 2025-06-26 | End: 2025-06-26 | Stop reason: SDUPTHER

## 2025-06-29 PROBLEM — F32.1 MAJOR DEPRESSIVE DISORDER, SINGLE EPISODE, MODERATE: Status: ACTIVE | Noted: 2025-06-29

## 2025-06-30 NOTE — PATIENT INSTRUCTIONS
Medicare Annual Wellness Visit      Patient Name: Brandie Lobato  Today's Date: 6/29/2025    Below you will find your 5-10 year Screening Plan Recommendations:  Health Maintenance       Date Due Completion Date    TETANUS VACCINE Never done ---    Colorectal Cancer Screening Never done ---    Mammogram 06/22/2021 6/22/2020    LDCT Lung Screen 03/27/2026 3/27/2025    Hemoglobin A1c (Prediabetes) 06/17/2026 6/17/2025    High Dose Statin 06/26/2026 6/26/2025    DEXA Scan 05/01/2030 5/1/2025    Lipid Panel 06/17/2030 6/17/2025          Below is your summarized Personalized Prevention Plan that addresses any concerns we discussed today at your visit. Please see attached detailed information specific to your Health Concerns.  Orders Placed This Encounter   Procedures    Mammo Digital Screening Bilat w/ Escobar (XPD)    Ambulatory referral/consult to Gastroenterology    Ambulatory referral/consult to Smoking Cessation Program         The following information is provided to all patients.  This information is to help you find additional resources for any problems that may be affecting your health: Living healthy guide: [email or web address].[email or web address].[email or web address]      Understanding Diabetes: www.diabetes.org      Eating healthy: www.cdc.gov/healthyweight      [facility] home safety checklist: www.cdc.gov/steadi/patient.html      Agency on Aging: www.[email or web address].[email or web address]      Alcoholics anonymous (AA): www.aa.org      Physical Activity: www.sarita.nih.gov/nb3cvhj      Tobacco use: www.quitwithusla.org                                 Patient Education       Quitting Smoking for Older Adults   About this topic   Most of us know that smoking can cause problems to our health. Even if you know that it is bad, you continue smoking. It is because when you start to smoke it is hard to give up. You can become addicted to smoking.  Smoking is very harmful to your health. It can cause many illnesses and can affect how you look. The longer you smoke, the greater the  effects on your health. It is never too late to try to quit.  As you stop smoking, it is normal to have signs of withdrawal. These will lessen over time. You may have signs like:  Trouble sleeping  Feeling more hungry  Weight gain  Hard stools  Dizziness  Sore throat or cough  Being irritable  Being anxious or restless  Getting frustrated or angry  Trouble thinking clearly  Low mood  Certain medicines given to you by your regular doctor can help improve these symptoms.  General   Older smokers are at high risk from smoking. The longer you have smoked, the more toxins you have been exposed to. Toxins are the bad chemicals in the tobacco. As someone who has smoked for a long time, you are more likely to have illnesses related to smoking.  It is never too late to quit smoking. It helps your health even at an older age. You will begin to notice changes soon after you stop smoking. Here are some steps you can take to help you quit smoking:  Set a date to quit smoking.  Tell your family and friends about your plan to quit smoking. Let them know how to help with your plan.  Plan ahead about what you will do instead of smoking when you crave a cigarette.  Remove cigarettes from your home, car, and work.  You may want to talk with a counselor to help you learn about:  What triggers you to smoke.  How to resist cravings.  Things to raise your chance of quitting smoking for good.  Counseling can be in person, over the phone, in a group, online, or through text messages.  Talk with your regular doctor about medicines to help you stop smoking.  Exercise regularly. This may help to lower stress. Going for a walk is good exercise.  Keep your mouth busy with sugar-free candy or gum.  Stay away from people and places that make you want to smoke. If others close to you smoke, ask them to quit with you or to not smoke around you.     What will the results be?   When you start to quit:  Blood flow improves right away.  Your lungs  become more active.  Heart rate and blood pressure lower.  You have less chance of having a heart attack.  You will help others be healthy since they are not around secondhand smoke.  After a few days to weeks:  Food tastes and smells better.  Your lungs begin to work better.  Breathing becomes easier.  After a few weeks to months:  You have more energy.  Lungs become clear and work better.  You are less likely to get colds and other lung infections.  Shortness of breath decreases.  Clogging of sinuses decreases.  When you have fully stopped smoking, after a while you will:  Lower the risk of lung cancer  Lower the risk of cancer of the mouth, esophagus, voice box, bladder, pancreas, cervix, and kidney  Lower the risk of heart illnesses like stroke and heart attack  Preserve your eyesight and the look of your teeth and aging skin  Lessen the risk for having type 2 diabetes  Reverse bone loss  Lessen the risk of postoperative problems  Lessen the risk of peptic ulcer and improve healing if ulcer is already there  Help others be healthy since they are not around secondhand smoke  Secondhand smoke:  It is important to know that smoking does not just affect you, it affects everyone around you.  It can cause cancer and heart disease in nonsmokers.  It is more dangerous for babies, children, and pregnant women.  It causes many of the same health problems in others, including your family and friends.  The only way to stop secondhand smoke is to quit smoking. It is also important to avoid places where you and others are around people who smoke.  What lifestyle changes are needed?   Thoroughly wash and remove all ashtrays from your home and office.  Watch your eating habits and avoid gaining weight. Eat healthy foods and snacks to keep a healthy weight  Keep your mouth busy with sugar free candy and gum.  Change your daily routine. Try to change things like eat breakfast at a different place or drink tea instead of  coffee.  Try to relax. Listen to music, meditate, do yoga or breathing exercises, take a relaxing bath or hot shower.  Control your thoughts and emotions. Write or record a journal, create new hobbies, and think positive.  Connect with family and friends. Talk to a friend, get a pet, share your problems with family members, and participate in your community.  What drugs may be needed?   The doctor may order drugs to:  Help with withdrawal signs  Reduce your urges to smoke  Gums, lozenges, and skin patches may be given as a substitute for tobacco.  Will there be any other care needed?   It helps to have other people to support you when you are trying to quit smoking. Talk to your family and friends about how they can best help you. You may also want to think about support groups or counseling.  When do I need to call the doctor?   You have signs of low mood or depression like:  Feeling sad, down, hopeless, or cranky most of the day, almost every day.  Losing or gaining weight without trying to do so.  Sleeping too much or too little.  Feeling tired or like you have no energy.  Acting restless or having trouble staying still.  Helpful tips   Commit yourself to stop smoking.  Focus on your goals and work to achieve them.  Talk with your doctor if you are thinking about switching to an electronic cigarette.  Where can I learn more?   American Cancer Society  https://www.cancer.org/latest-news/bkdex-xkf-trly-to-quit-smoking.html   American Lung Association  http://www.lung.org/stop-smoking/s-jtag-ke-quit/   Centers for Disease Control and Prevention  http://www.cdc.gov/tobacco/campaign/tips/   Last Reviewed Date   2021-06-08  Consumer Information Use and Disclaimer   This information is not specific medical advice and does not replace information you receive from your health care provider. This is only a brief summary of general information. It does NOT include all information about conditions, illnesses, injuries,  tests, procedures, treatments, therapies, discharge instructions or life-style choices that may apply to you. You must talk with your health care provider for complete information about your health and treatment options. This information should not be used to decide whether or not to accept your health care providers advice, instructions or recommendations. Only your health care provider has the knowledge and training to provide advice that is right for you.  Copyright   Copyright © 2021 UpToDate, Inc. and its affiliates and/or licensors. All rights reserved.    Patient Education       Smoking: Not Just Harmful to Your Lungs and Heart   About this topic   Smoking is very common at any age. It is one of the leading causes of poor health and illness. Smoking can lead to death. It has many harmful chemicals that are released by the tobacco you smoke and inhale. Tobacco has many forms. These are:  Cigarettes  Pipes  Cigars  Chewing tobacco  Electronic cigarettes  Loose  Hookah  All kinds of tobaccos contain many toxic substances. These are what make you sick from smoking.  Nicotine - The main thing that makes you addicted to smoking  Carbon monoxide - A poison that gets into your blood when smoking  Tar - Has chemicals that are cancerous  Lead and many others  These factors affect how much damage smoking will have on you:  How much you smoke  What you smoke  How what you smoke has been prepared  The content of what you smoke  Smoking hurts every part of the body. The lungs and the heart are most often affected by tobacco use.  General   Smoking is very harmful to your body. It can cause many illnesses and can affect how you look.  Smoking and Cancer   Smoking is the most common cause of lung cancer. Smoking may also increase the risk of:  Mouth cancer. This can also be caused by chewing tobacco.  Bladder cancer  Cancer of the tube from the mouth to stomach. This is also called the esophagus.  Cancer of the throat. This  can also be caused by chewing tobacco.  Kidney cancer  Liver cancer  Stomach, colon, and rectal cancer  Cancer of the pancreas  Cancer of the cervix in women  Cancer of the blood or leukemia  Skin cancer  Smoking and Your Lungs   If you smoke you are more likely to have:  Breathing problems  Lung infections like pneumonia or bronchitis  Lung disease such as COPD or emphysema  Asthma  Smoking and the Heart and Circulation   Causes heart disease and stroke  Smokers are at a higher risk for high blood pressure  You are more likely to get blood clots  Smoking can lower blood flow to the legs and skin  Smoking and Diabetes   If you smoke, you:  Have a higher risk of developing diabetes  May have higher blood sugar levels  May have more problems with your diabetes  Smoking and Digestion   Smokers make more stomach acid. This can cause sores or ulcers in your belly or bowel.  Your stomach acids may flow back to your esophagus. This is also called heartburn.  You have more chance of bowel irritation and swelling  Smoking and Your Bones   If you smoke:  Your bone-forming cells don't grow as fast  Your bones may become weaker (osteoporosis)  You may have more low back pain and arthritis  You may have more overuse injuries  You may have a greater risk of breaking bones  Your broken bones may take longer to heal  Smoking and Pregnancy   Makes your baby more prone to problems  You have a higher chance of having a premature baby or baby born early  Your healthy baby may die without reason. This is called sudden infant death syndrome.  Your baby may be born dead (stillbirth)  Your baby may have a low birth weight  You have a higher risk of an ectopic pregnancy or a pregnancy outside of the uterus  You have a higher chance of having a baby with mouth or facial defects  Smoking and Your Eyes, Hair, Hands, and Mouth   If you smoke, you may notice your:  Eyes become cloudy and form cataracts  Hair may get thin and turn gray sooner  than it should  Fingernails turn yellow  Teeth become yellowish brown  Gums have more problems  Teeth have problems or even become loose  Sense of taste and smell start to go away  Breath smells bad  Smoking and Skin   Smoking causes:  Less blood flow to the skin  Wrinkles start early around your eyes and mouth  Dry skin  Your skin to lose elasticity and strength  Skin may get splotchy or pale  Your face to look thin and old. This is called smoker's face.  Greater risk of getting a skin problem called psoriasis  Slower healing of cuts or bruises  Smoking and Sex Life   If you smoke you are more likely to have problems like:  Impotence  Decreased blood flow to the penis. This is also called erectile dysfunction.  Trouble getting pregnant  Early change of life or menopause for women  A higher risk of heart attack or stroke for women who smoke and use birth control pills  Damaged sperm that may lead to problems getting a woman pregnant, birth defects, or miscarriage  Smoking and Your Brain and Behavior   Smoking can:  Affect your mood  Lead to addiction  Cause long-term confusion or damage to your brain cells  Cause low mood  Smoking and Children   If you smoke, your children:  Will be more likely to smoke.  Can be sick from being around your smoke. This is called secondhand smoke.  Need to learn about the bad effects of smoking. Most smokers start before the age of 18. Encourage your children never to smoke.  Smoking and Other Effects   Smoking can cause:  Wounds to take longer to heal  You to eat poorly  Weight loss  Swelling in the body and affect the body's immune or defense system  Rheumatoid arthritis  Greater chance of injury  You to get warts easier (human papillomavirus)  A bad odor in hair and on clothes  You to have poor sports performance  You to spend a lot of money. Smoking is an expensive habit. A smoker who smokes a pack per day in the [place] will spend over $2000 per year on cigarettes.  Health care to  cost more  You to miss work more often  Hearing loss  Even if you have smoked for many years, it is not too late to quit. Your body starts to heal as soon as you stop smoking. It is better to quit when you are young, but you can still get healthier if you quit at any age.       Helpful tips   Some helpful steps you can take to help you quit smoking:  Set a date to quit smoking.  Know the reasons that make you smoke more.  Know why you want to quit smoking.  Write down each time you smoke. Include the time and what you are doing. Plan ahead about what you will do instead of smoking when that time or event reappears.  Tell your family and friends about your plan to quit smoking. Let them know how to help you.  Slowly reduce your smoking.  Remove smoking and tobacco products from your home and other places.  Avoid places and situations where you will more likely smoke. If people close to you smoke, ask them to quit with you. If they do not quit, ask them to not smoke around you.  Reward or treat yourself every time you do not smoke. Do not use food as a reward.  Ask a doctor for help.  Talk with your doctor before you try an electronic cigarette.  Where can I learn more?   Centers for Disease Control and Prevention  http://www.cdc.gov/tobacco/data_statistics/fact_sheets/health_effects/effects_cig_smoking/#overview   Centers for Disease Control and Prevention  https://www.cdc.gov/tobacco/campaign/tips/quit-smoking/guide/quit-plan.html?s_cid=OSH_tips_D9400   KidsHealth  http://kidshealth.org/teen/drug_alcohol/tobacco/smoking.html#   US Food and Drug Administration  https://www.fda.gov/TobaccoProducts/Labeling/ProductsIngredientsComponents/default.htm   Last Reviewed Date   2021-03-31  Consumer Information Use and Disclaimer   This information is not specific medical advice and does not replace information you receive from your health care provider. This is only a brief summary of general information. It does NOT include  all information about conditions, illnesses, injuries, tests, procedures, treatments, therapies, discharge instructions or life-style choices that may apply to you. You must talk with your health care provider for complete information about your health and treatment options. This information should not be used to decide whether or not to accept your health care providers advice, instructions or recommendations. Only your health care provider has the knowledge and training to provide advice that is right for you.  Copyright   Copyright © 2021 Up[employer]Date, Inc. and its affiliates and/or licensors. All rights reserved.    Patient Education       Depression Discharge Instructions, Adult   About this topic   Depression is different than normal sadness. Depression can make it hard for you to work, sleep, and enjoy life. Signs of depression include:  No longer enjoying or caring about doing the things you used to.  Feeling sad, down, hopeless, or cranky most of the day, almost every day.  Losing or gaining weight without trying to do so.  Sleeping too much or too little.  Feeling tired or like you have no energy.  Feeling guilty or like you are worth nothing.  Forgetting things or feeling confused.  Moving and speaking more slowly than usual.  Acting restless or having trouble staying still.  Depression is caused by chemicals in your brain being out of balance. Treatment for depression may take a little while to start working.     What care is needed at home?   Ask your doctor what you need to do when you go home. Make sure you ask questions if you do not understand what the doctor says.  Go to counseling or support groups as suggested by your doctor.  Avoid beer, wine, and mixed drinks. Also avoid marijuana and illegal drugs.  Speak with trusted family and friends about your depression and how they can help.  Exercise each day. Try to spend time outside each day. Sunshine may make you feel better.  Have a regular sleep  pattern and try to get 6 to 8 hours of sleep each night.  Learn how to cope with stress. Guided imagery, yoga, darío chi, or deep breathing may help reduce your signs.  What follow-up care is needed?   Depression needs to be watched closely. Your doctor may ask you to make visits to the office to check on your progress. Be sure to keep these visits.  What drugs may be needed?   The doctor may order drugs to:  Treat low mood  Improve sleep  Relieve distress and tension  Will physical activity be limited?   Physical activity like sports and exercise may help with recovery. Talk to your doctor about what activity will be good. Ask your doctor if you need help to manage any tiredness the drugs may cause.  What changes to diet are needed?   Eating a healthy diet is important during this time. This means:  Eat whole grain foods and foods high in fiber.  Choose many different fruits and vegetables. Fresh or frozen is best.  Eat less solid fats like butter or margarine. Eat less fatty or processed foods.  Eat more low-fat or lean meats like chicken, fish, or turkey. Eat less red meat.  Avoid caffeine. Try not to drink soda, coffee, tea, or energy drinks.  What can be done to prevent this health problem?   Exercise each day.  Try to spend time outside each day. Sunshine can make you feel better.  Have a regular sleep pattern where you get 6 to 8 hours of sleep each night.  Learn how to cope with stress. Guided imagery, yoga, darío chi, or deep breathing may help relieve your signs.  Learn about depression and its signs. Then, you can get help early.  Join a support group. Learn how others are living well with depression.  When do I need to call the doctor?   You have thoughts of hurting yourself or someone else.  Your depression does not get better within 1 or 2 weeks.  Your depression is getting worse.  Your family and friends say they are worried about you.  You continue to have problems eating or sleeping.  You are  functioning poorly at work, at home, or in school.  Teach Back: Helping You Understand   The Teach Back Method helps you understand the information we are giving you. After you talk with the staff, tell them in your own words what you learned. This helps to make sure the staff has described each thing clearly. It also helps to explain things that may have been confusing. Before going home, make sure you can do these:  I can tell you about my condition.  I can tell you what may help me relax and ease my stress.  I can tell you what I will do if I think about hurting myself or someone else or if my depression is not getting better.  Where can I learn more?   American Psychiatric Association  https://www.psychiatry.org/patients-families/depression/what-is-depression   Centers for Disease Control and Prevention  https://www.cdc.gov/learnmorefeelbetter/programs/depression.htm   National Association of Mental Health  https://www.jorje.org/learn-more/mental-health-conditions/depression   [employer]  https://www.revoPT.[employer]/contents/depression-in-adults-beyond-the-basics   Last Reviewed Date   2020-06-10  Consumer Information Use and Disclaimer   This information is not specific medical advice and does not replace information you receive from your health care provider. This is only a brief summary of general information. It does NOT include all information about conditions, illnesses, injuries, tests, procedures, treatments, therapies, discharge instructions or life-style choices that may apply to you. You must talk with your health care provider for complete information about your health and treatment options. This information should not be used to decide whether or not to accept your health care providers advice, instructions or recommendations. Only your health care provider has the knowledge and training to provide advice that is right for you.  Copyright   Copyright © 2021 UpToDate, Inc. and its affiliates and/or  licensors. All rights reserved.    Patient Education       Anxiety Discharge Instructions, Adult   About this topic   Anxiety can cause you to feel very worried. It can also cause physical symptoms like chest pain, stomach aches, or trouble sleeping. While mild anxiety is a normal response to stress, it can cause you problems in your everyday life. You may need follow-up care to help manage your anxiety. Anxiety happens in many forms, like:  Being scared all the time that something bad is going to happen. This is generalized anxiety.  Strong bursts of fear where your body has signs that may feel like a heart attack. This is called a panic attack.  Upsetting thoughts that happen often. There is a need to repeat doing certain things to help get rid of the anxiety caused by these thoughts. The thoughts or actions may be about checking on things, touching things, or worry about germs. This is an obsessive-compulsive disorder.  Strong fear of an object, place, or condition. This is a phobia.  Fear that others think bad things about you or being put down by other people. This is social anxiety.  Nightmares, flashbacks, staying away from people, or having panic attacks when reminded of a shocking or hurtful time or place from the past. This is post-traumatic stress.  Anxiety disorder may be treated in many ways. Some kinds of treatment have you talk about your beliefs, fears, and worries. You may learn how certain thoughts or feelings can raise anxiety. You may also learn what steps to take to lower anxiety. Other kinds of treatment may have you look back on a hurtful event, sad memory, or something you are afraid of. The doctor will help you deal with the feelings that you may have. You may learn ways to cope with unwanted events or thoughts by looking at your fears in a way that feels safe.  What care is needed at home?   Ask your doctor what you need to do when you go home. Make sure you ask questions if you do not  understand what the doctor says.  Set a time to talk with a counselor about your worries and feelings. This can help you with your anxiety.  Take care to follow all instructions when you take your medicines.  Limit alcohol and caffeine.  Learn ways to manage stress. Relaxation methods like reflection, deep breathing, and muscle relaxation may be helpful. Things like yoga, exercise, and darío chi are also good.  Talk about your feelings with family members and friends you trust. Talk to someone who can help you see how your thoughts at certain times may raise your anxiety.     What follow-up care is needed?   Your doctor may ask you to make visits to the office to check on your progress. Be sure to keep these visits.  What drugs may be needed?   The doctor may order drugs to help the physical signs of anxiety. Make sure that you take the drugs as taught to you by the doctor. Talk with your doctor about any side effects and ask how long you should take the drug.  Will physical activity be limited?   You may take part in physical activities. Some people are limited because of their anxiety or fear. Talk with your doctor about the right amount of activity for you.  What changes to diet are needed?   Eat a variety of healthy foods and limit drinks with caffeine. You should avoid alcohol, energy drinks, and over-the-counter stimulants.  What problems could happen?   If your anxiety is not treated, it can result in:  Staying away from work or social events  Not being able to do everyday tasks  Keeping away from family and friends  What can be done to prevent this health problem?   Learn what events, people, or things upset you. Limit your contact with them.  Talk about your feelings. Talk to someone who can help you see how your thoughts at certain times may raise your anxiety.  Seek support from your friends and family. Find someone who calms you down. Ask if you can call them when you are getting anxious.  When do I need  to call the doctor?   You feel you may harm yourself or someone else.  You can also call a mental health hotline for help.  You have any physical symptoms, such as chest pain, trouble breathing, or severe belly pain, that could be a sign of a serious problem.  If you are short of breath.  If you do not feel like you can be alone.  Teach Back: Helping You Understand   The Teach Back Method helps you understand the information we are giving you. After you talk with the staff, tell them in your own words what you learned. This helps to make sure the staff has described each thing clearly. It also helps to explain things that may have been confusing. Before going home, make sure you can do these:  I can tell you about my condition and the drugs I need to take.  I can tell you what may help lower my anxiety.  I can tell you what I will do if it is hard to breathe or I have chest pain.  I can tell you what I will do if I do not feel safe or cannot be alone.  Where can I learn more?   [facility]  https://www.berny.org/Learn-More/Mental-Health-Conditions/Anxiety-Disorders   [facility]  https://www.nhs.uk/conditions/generalised-anxiety-disorder/symptoms/   [employer] [place] [employer] [facility]  https://www.sarita.nih.gov/health/relieving-stress-anxiety-tayeryejn-wfzuboadkx-kckykxqfsl   [employer] [place] [employer]  http://www.nimh.nih.gov/health/publications/anxiety-disorders/complete-index.shtml   Last Reviewed Date   2021-06-08  Consumer Information Use and Disclaimer   This information is not specific medical advice and does not replace information you receive from your health care provider. This is only a brief summary of general information. It does NOT include all information about conditions, illnesses, injuries, tests, procedures, treatments, therapies, discharge instructions or life-style choices that may apply to you. You must talk with your health care provider for complete information about your  health and treatment options. This information should not be used to decide whether or not to accept your health care providers advice, instructions or recommendations. Only your health care provider has the knowledge and training to provide advice that is right for you.  Copyright   Copyright © 2021 [employer] and its affiliates and/or licensors. All rights reserved.

## 2025-06-30 NOTE — PROGRESS NOTES
"   Internal Medicine    Brandie Lobato is a 70 y.o. female here today for a Medicare Annual Wellness visit and comprehensive Health Risk Assessment.     Subjective   The following components were reviewed and updated:  Medical history  Family History  Social history  Allergies  Current Medications  Immunizations  Health Maintenance  Patient Care Team    Review of Systems  A comprehensive review of systems was conducted and is negative except as noted above.     Objective   Visit Vitals  /66 (BP Location: Right arm, Patient Position: Sitting)   Pulse 76   Temp 97.4 °F (36.3 °C) (Temporal)   Ht 5' 7.01" (1.702 m)   Wt 66.4 kg (146 lb 6.4 oz)   SpO2 97%   BMI 22.92 kg/m²        Physical Exam  Vitals and nursing note reviewed.   Constitutional:       Appearance: Normal appearance. She is normal weight.   HENT:      Head: Normocephalic and atraumatic.      Right Ear: Tympanic membrane, ear canal and external ear normal.      Left Ear: Tympanic membrane, ear canal and external ear normal.      Nose: Nose normal.      Mouth/Throat:      Mouth: Mucous membranes are moist.      Pharynx: Oropharynx is clear.   Eyes:      Extraocular Movements: Extraocular movements intact.      Conjunctiva/sclera: Conjunctivae normal.      Pupils: Pupils are equal, round, and reactive to light.   Cardiovascular:      Rate and Rhythm: Normal rate and regular rhythm.      Heart sounds: Normal heart sounds.   Pulmonary:      Effort: Pulmonary effort is normal.      Breath sounds: Normal breath sounds.   Abdominal:      General: Abdomen is flat. Bowel sounds are normal. There is no distension.      Palpations: Abdomen is soft. There is no mass.      Tenderness: There is no abdominal tenderness. There is no guarding or rebound.      Hernia: No hernia is present.   Musculoskeletal:         General: Normal range of motion.      Cervical back: Normal range of motion and neck supple.      Right lower leg: No edema.      Left lower leg: No " edema.   Skin:     General: Skin is warm and dry.      Coloration: Skin is not jaundiced or pale.      Findings: No rash.   Neurological:      General: No focal deficit present.      Mental Status: She is alert and oriented to person, place, and time. Mental status is at baseline.   Psychiatric:         Mood and Affect: Mood normal.         Behavior: Behavior normal.         Thought Content: Thought content normal.         Judgment: Judgment normal.          Assessment/Plan:  1. Medicare annual wellness visit, subsequent    2. Encounter for immunization    3. Depression, unspecified depression type  -     Discontinue: EScitalopram oxalate (LEXAPRO) 10 MG tablet; Take 1 tablet (10 mg total) by mouth once daily.  Dispense: 90 tablet; Refill: 3  -     EScitalopram oxalate (LEXAPRO) 10 MG tablet; Take 1 tablet (10 mg total) by mouth once daily.  Dispense: 90 tablet; Refill: 3    4. Colon cancer screening  -     Ambulatory referral/consult to Gastroenterology; Future; Expected date: 07/06/2025    5. Encounter for screening mammogram for breast cancer  -     Mammo Digital Screening Bilat w/ Escobar (XPD); Future; Expected date: 06/29/2025    6. Tobacco abuse counseling  -     Ambulatory referral/consult to Smoking Cessation Program; Future; Expected date: 07/06/2025    7. Encounter for preventive health examination    8. Tobacco dependence    9. Positive screening for depression on 9-item Patient Health Questionnaire (PHQ-9)    10. Anxiety        A comprehensive HEALTH RISK ASSESSMENT was completed today. Results are summarized below:    The following EMOTIONAL/SOCIAL CONCERNS were identified on today's screening for Social Isolation, Depression and Anxiety:  *Patient reports frequently feeling STRESSED or ANXIOUS. ( )  --A General Anxiety Disorder-7 Questionnaire was administered with the folllowing results: ( )  *Patient reports symptoms of MODERATELY SEVERE DEPRESSION.        There are NO COGNITIVE FUNCTION CONCERNS  identified on today's screening.  The following FUNCTIONAL AND/OR SAFETY CONCERNS were identified on today's screening for Physical Symptoms, Nutritional, Home Safety/Living Situation, Fall Risk, Activities of Daily Living, Independent Activities of Daily Living, Physical Activity,Timed Up and Go test and Whisper test::  *Patient reports PHYSICAL ACTIVITY LIMITED BY PAIN/FATIGUE. ( )      The patient reports NO OPIOID PRESCRIPTIONS. This was confirmed through medication reconciliation.    The patient is A CURRENT TOBACCO USER.  Tobacco Use: High Risk (6/26/2025)    Patient History     Smoking Tobacco Use: Every Day     Smokeless Tobacco Use: Never     Passive Exposure: Not on file           All Questions regarding food, transportation or housing were not answered today.    I provided Brandie Lobato with a 5-10 year written Screening Schedule per USPSTF age appropriate recommendations and a Personal Prevention Plan based on the results of today's Health Risk Assessment. Education, counseling, and referrals were provided as documented above and can be viewed in the After Visit Summary.    Follow up in about 4 weeks (around 7/24/2025) for Med eval. In addition to this scheduled follow up, the patient has also been instructed to follow up on as needed basis.     none  The patient was asked and declined the use of a free .  Advance Care Planning   Today we discussed advance care planning. She is interested in learning more about how to make Advance Directives. Information was provided and I offered to discuss more at her discretion.

## 2025-07-02 ENCOUNTER — TELEPHONE (OUTPATIENT)
Dept: FAMILY MEDICINE | Facility: CLINIC | Age: 70
End: 2025-07-02
Payer: MEDICARE

## 2025-07-07 ENCOUNTER — TELEPHONE (OUTPATIENT)
Dept: FAMILY MEDICINE | Facility: CLINIC | Age: 70
End: 2025-07-07
Payer: MEDICARE

## 2025-07-07 NOTE — TELEPHONE ENCOUNTER
They are noted - patient refused.    Does patient want to try another medication in place on lexapro?

## 2025-08-19 DIAGNOSIS — M25.562 CHRONIC PAIN OF BOTH KNEES: ICD-10-CM

## 2025-08-19 DIAGNOSIS — M25.561 CHRONIC PAIN OF BOTH KNEES: ICD-10-CM

## 2025-08-19 DIAGNOSIS — G89.29 CHRONIC PAIN OF BOTH KNEES: ICD-10-CM

## 2025-08-19 DIAGNOSIS — F51.04 PSYCHOPHYSIOLOGICAL INSOMNIA: ICD-10-CM

## 2025-08-19 DIAGNOSIS — I10 PRIMARY HYPERTENSION: ICD-10-CM

## 2025-08-20 ENCOUNTER — TELEPHONE (OUTPATIENT)
Dept: FAMILY MEDICINE | Facility: CLINIC | Age: 70
End: 2025-08-20
Payer: MEDICARE

## 2025-08-20 RX ORDER — ZOLPIDEM TARTRATE 10 MG/1
10 TABLET ORAL NIGHTLY
Qty: 30 TABLET | Refills: 1 | Status: SHIPPED | OUTPATIENT
Start: 2025-08-20

## 2025-08-20 RX ORDER — MELOXICAM 7.5 MG/1
7.5 TABLET ORAL DAILY PRN
Qty: 30 TABLET | Refills: 2 | Status: SHIPPED | OUTPATIENT
Start: 2025-08-20

## 2025-08-20 RX ORDER — LISINOPRIL 10 MG/1
10 TABLET ORAL DAILY
Qty: 90 TABLET | Refills: 0 | Status: SHIPPED | OUTPATIENT
Start: 2025-08-20